# Patient Record
Sex: MALE | Race: WHITE | NOT HISPANIC OR LATINO | Employment: FULL TIME | URBAN - METROPOLITAN AREA
[De-identification: names, ages, dates, MRNs, and addresses within clinical notes are randomized per-mention and may not be internally consistent; named-entity substitution may affect disease eponyms.]

---

## 2017-03-02 ENCOUNTER — OFFICE VISIT (OUTPATIENT)
Dept: FAMILY MEDICINE | Facility: CLINIC | Age: 32
End: 2017-03-02
Payer: COMMERCIAL

## 2017-03-02 VITALS
WEIGHT: 245 LBS | HEART RATE: 88 BPM | TEMPERATURE: 98.2 F | DIASTOLIC BLOOD PRESSURE: 78 MMHG | BODY MASS INDEX: 35.07 KG/M2 | RESPIRATION RATE: 16 BRPM | HEIGHT: 70 IN | SYSTOLIC BLOOD PRESSURE: 128 MMHG

## 2017-03-02 DIAGNOSIS — Z30.09 ENCOUNTER FOR VASECTOMY COUNSELING: Primary | ICD-10-CM

## 2017-03-02 PROCEDURE — 99213 OFFICE O/P EST LOW 20 MIN: CPT | Performed by: FAMILY MEDICINE

## 2017-03-02 RX ORDER — DIAZEPAM 5 MG
TABLET ORAL
Qty: 2 TABLET | Refills: 0 | Status: SHIPPED | OUTPATIENT
Start: 2017-03-02 | End: 2017-04-07

## 2017-03-02 ASSESSMENT — PAIN SCALES - GENERAL: PAINLEVEL: NO PAIN (0)

## 2017-03-02 NOTE — NURSING NOTE
"Chief Complaint   Patient presents with     Consult     Vas       Initial /78 (BP Location: Left arm, Patient Position: Chair, Cuff Size: Adult Large)  Pulse 88  Temp 98.2  F (36.8  C) (Tympanic)  Resp 16  Ht 5' 10.2\" (1.783 m)  Wt 245 lb (111.1 kg)  BMI 34.95 kg/m2 Estimated body mass index is 34.95 kg/(m^2) as calculated from the following:    Height as of this encounter: 5' 10.2\" (1.783 m).    Weight as of this encounter: 245 lb (111.1 kg).  Medication Reconciliation: complete   Geno Mckeon CMA (AAMA)   "

## 2017-03-02 NOTE — MR AVS SNAPSHOT
"              After Visit Summary   3/2/2017    Colton Snider    MRN: [id number]           Patient Information     Date Of Birth          1985        Visit Information        Provider Department      3/2/2017 7:00 AM Jj Puckett MD [facility]        Today's Diagnoses     Encounter for vasectomy counseling    -  1       Follow-ups after your visit        Who to contact     If you have questions or need follow up information about today's clinic visit or your schedule please contact [facility] directly at [phone].  Normal or non-critical lab and imaging results will be communicated to you by CloudFabhart, letter or phone within 4 business days after the clinic has received the results. If you do not hear from us within 7 days, please contact the clinic through [employer] or phone. If you have a critical or abnormal lab result, we will notify you by phone as soon as possible.  Submit refill requests through [employer] or call your pharmacy and they will forward the refill request to us. Please allow 3 business days for your refill to be completed.          Additional Information About Your Visit        MyChart Information     [employer] gives you secure access to your electronic health record. If you see a primary care provider, you can also send messages to your care team and make appointments. If you have questions, please call your primary care clinic.  If you do not have a primary care provider, please call [phone] and they will assist you.        Care EveryWhere ID     This is your Care EveryWhere ID. This could be used by other organizations to access your [facility] medical records  [id number]        Your Vitals Were     Pulse Temperature Respirations Height BMI (Body Mass Index)       88 98.2  F (36.8  C) (Tympanic) 16 5' 10.2\" (1.783 m) 34.95 kg/m2        Blood Pressure from Last 3 Encounters:   03/02/17 128/78   10/21/16 116/76   07/19/16 141/89    Weight from Last " 3 Encounters:   03/02/17 245 lb (111.1 kg)   10/21/16 249 lb (112.9 kg)   03/11/16 239 lb (108.4 kg)              Today, you had the following     No orders found for display         Today's Medication Changes          These changes are accurate as of: 3/2/17  2:12 PM.  If you have any questions, ask your nurse or doctor.               Start taking these medicines.        Dose/Directions    diazepam 5 MG tablet   Commonly known as:  VALIUM   Used for:  Encounter for vasectomy counseling   Started by:  Jj Puckett MD        Take 30 minutes before procedure.  May repeat x1   Quantity:  2 tablet   Refills:  0            Where to get your medicines      Some of these will need a paper prescription and others can be bought over the counter.  Ask your nurse if you have questions.     Bring a paper prescription for each of these medications     diazepam 5 MG tablet                Primary Care Provider Office Phone # Fax #    Jj Puckett [phone]       [address]   [facility] [address]        Thank you!     Thank you for choosing [facility]  for your care. Our goal is always to provide you with excellent care. Hearing back from our patients is one way we can continue to improve our services. Please take a few minutes to complete the written survey that you may receive in the mail after your visit with us. Thank you!             Your Updated Medication List - Protect others around you: Learn how to safely use, store and throw away your medicines at www.disposemymeds.org.          This list is accurate as of: 3/2/17  2:12 PM.  Always use your most recent med list.                   Brand Name Dispense Instructions for use    diazepam 5 MG tablet    VALIUM    2 tablet    Take 30 minutes before procedure.  May repeat x1       triamcinolone 0.1 % ointment    KENALOG    80 g    Apply sparingly to affected area three times daily for 14 days.

## 2017-03-02 NOTE — PROGRESS NOTES
"SUBJECTIVE:  This 31 year old male is in for a vasectomy consultation.  He and his partner have decided they don't want to have any more children. They have decided that he will have the sterilization procedure instead of her.  He understands that this is a permanent procedure.  Reversal could be an option, but has a less than 25% success rate.  Patient was given information to read prior to the consultation.      We talked about the risks and benefits of the vasectomy; risks being that of potential for bleeding, infection, postoperative discomfort immediately which can last for a number of weeks afterwards, also the development of spermatoceles or sperm granulomas, epididymal cysts and the possibility of failure of the procedure producing failed attempt at sterility.     Also mentioned to the patient that there is some literature out there that suggests men who have vasectomies are at increased risk for prostate cancer; however, this literature is inconclusive and controversial.  It is recommended that men who have vasectomies should discuss appropriate prostate cancer screening with their regular provider.     I went through the procedure with the patient, how it is done, a midline incision in the scrotum measuring approximately 1/2 inch in length.  The vas deferens is brought up through this incision, dissected free and cut in two.  Each open end is cauterized and then the proximal or distal end is buried away from the other.  Patient had no questions when it came to the procedure itself.  I did show him pictures and diagrams of the procedure.  I showed him where a potential spermatocele or sperm granuloma can occur.      Again, the patient had no further questions for me.    OBJECTIVE:  /78 (BP Location: Left arm, Patient Position: Chair, Cuff Size: Adult Large)  Pulse 88  Temp 98.2  F (36.8  C) (Tympanic)  Resp 16  Ht 5' 10.2\" (1.783 m)  Wt 245 lb (111.1 kg)  BMI 34.95 kg/m2  On exam, this is a " well-developed, well-nourished white male.    Exam reveals a normal circumcised penis, no lesions.  Testes are descended bilaterally.  Exam was limited to the genitalia.  Both vas deferens were easily identified.  No other abnormalities were noted.      ASSESSMENT:  Undesired fertility in an adult male, requesting vasectomy.    PLAN:  He will schedule a vasectomy at his convenience for either the last appointment of the morning or on a Thursday or Friday afternoon.  He is aware that he will need to take the entire weekend off and have essentially bedrest for two days with ice packs every two hours and ibuprofen for discomfort.  I offered him a prescription for ibuprofen 800 mg to take every eight hours with food after the procedure, but he declined the prescription as he prefers to use the OTC equivalent.  I gave him a prescription for Valium two 5 mg tablets.  He will take 1 tablet upon arrival to clinic and will have the second tablet available to take as needed.  He understands that he will need a  after the procedure due to taking this type of medication.    If he has any further questions, he will contact me prior to the procedure or ask me on the day of the procedure.  He also is aware that he will need to bring a specimen after 12 weeks to make sure that he has cleared the storage vesicles of any residual sperm and to make sure that he is sterile.  He understands that until this is done, he and his partner should continue their regular method of contraception.  I also recommended that he submit a repeat specimen 1 year after the procedure to document continued sterility.      I spent 20 minutes of face to face time with the patient, >50% of which was spent counseling the patient on his future vasectomy.     Jj Puckett MD

## 2017-04-07 ENCOUNTER — OFFICE VISIT (OUTPATIENT)
Dept: FAMILY MEDICINE | Facility: CLINIC | Age: 32
End: 2017-04-07
Payer: COMMERCIAL

## 2017-04-07 VITALS
HEIGHT: 70 IN | TEMPERATURE: 98 F | OXYGEN SATURATION: 99 % | HEART RATE: 74 BPM | DIASTOLIC BLOOD PRESSURE: 74 MMHG | BODY MASS INDEX: 35.07 KG/M2 | RESPIRATION RATE: 18 BRPM | SYSTOLIC BLOOD PRESSURE: 128 MMHG | WEIGHT: 245 LBS

## 2017-04-07 DIAGNOSIS — Z30.2 ENCOUNTER FOR VASECTOMY: Primary | ICD-10-CM

## 2017-04-07 DIAGNOSIS — Z30.2 ENCOUNTER FOR STERILIZATION: ICD-10-CM

## 2017-04-07 PROCEDURE — 55250 REMOVAL OF SPERM DUCT(S): CPT | Performed by: FAMILY MEDICINE

## 2017-04-07 ASSESSMENT — PAIN SCALES - GENERAL: PAINLEVEL: NO PAIN (0)

## 2017-04-07 NOTE — MR AVS SNAPSHOT
"              After Visit Summary   4/7/2017    Colton Snider    MRN: [id number]           Patient Information     Date Of Birth          1985        Visit Information        Provider Department      4/7/2017 2:45 PM Jj Puckett MD; NL PROC ROOM ONE [facility]         Follow-ups after your visit        Who to contact     If you have questions or need follow up information about today's clinic visit or your schedule please contact [facility] directly at [phone].  Normal or non-critical lab and imaging results will be communicated to you by MyChart, letter or phone within 4 business days after the clinic has received the results. If you do not hear from us within 7 days, please contact the clinic through IndyGeekhart or phone. If you have a critical or abnormal lab result, we will notify you by phone as soon as possible.  Submit refill requests through [facility] or call your pharmacy and they will forward the refill request to us. Please allow 3 business days for your refill to be completed.          Additional Information About Your Visit        MyChart Information     [facility] gives you secure access to your electronic health record. If you see a primary care provider, you can also send messages to your care team and make appointments. If you have questions, please call your primary care clinic.  If you do not have a primary care provider, please call [phone] and they will assist you.        Care EveryWhere ID     This is your Care EveryWhere ID. This could be used by other organizations to access your [facility] medical records  [id number]        Your Vitals Were     Pulse Temperature Respirations Height Pulse Oximetry BMI (Body Mass Index)    74 98  F (36.7  C) (Tympanic) 18 5' 10\" (1.778 m) 99% 35.15 kg/m2       Blood Pressure from Last 3 Encounters:   04/07/17 128/74   03/02/17 128/78   10/21/16 116/76    Weight from Last 3 Encounters:   04/07/17 245 " lb (111.1 kg)   03/02/17 245 lb (111.1 kg)   10/21/16 249 lb (112.9 kg)              Today, you had the following     No orders found for display         Today's Medication Changes          These changes are accurate as of: 4/7/17  3:42 PM.  If you have any questions, ask your nurse or doctor.               Stop taking these medicines if you haven't already. Please contact your care team if you have questions.     diazepam 5 MG tablet   Commonly known as:  VALIUM   Stopped by:  Jj Puckett MD           triamcinolone 0.1 % ointment   Commonly known as:  KENALOG   Stopped by:  Jj Puckett MD                    Primary Care Provider Office Phone # Fax #    Jj Puckett [phone]       [address]   [facility] [address]        Thank you!     Thank you for choosing [facility]  for your care. Our goal is always to provide you with excellent care. Hearing back from our patients is one way we can continue to improve our services. Please take a few minutes to complete the written survey that you may receive in the mail after your visit with us. Thank you!             Your Updated Medication List - Protect others around you: Learn how to safely use, store and throw away your medicines at www.disposemymeds.org.      Notice  As of 4/7/2017  3:42 PM    You have not been prescribed any medications.

## 2017-04-07 NOTE — NURSING NOTE
"Chief Complaint   Patient presents with     Vasectomy     consult on 3-2-2017       Initial /74 (BP Location: Left arm, Patient Position: Chair, Cuff Size: Adult Large)  Pulse 74  Temp 98  F (36.7  C) (Tympanic)  Resp 18  Ht 5' 10\" (1.778 m)  Wt 245 lb (111.1 kg)  SpO2 99%  BMI 35.15 kg/m2 Estimated body mass index is 35.15 kg/(m^2) as calculated from the following:    Height as of this encounter: 5' 10\" (1.778 m).    Weight as of this encounter: 245 lb (111.1 kg).  Medication Reconciliation: complete     Pt and wife in room. Went over directions with them, gave them speciman cups, labels.    "

## 2017-04-09 NOTE — PROGRESS NOTES
SUBJECTIVE:  Colton Snider is a 31 year old male who comes in today for vasectomy.  The patient (and his significant other) have previously been in and we discussed the other options for birth control, the risks and benefits of the procedure.  Details of those risks and benefits have been noted on the consent form which has been signed. Prostate cancer data was also reviewed.  Patient took 10 mg of valium prior to procedure.  All questions have been answered.    Procedure:  In the supine position,the patient was sterilely prepped and draped in the usual fashion.  The left vas was first identified and brought up to the midline raphae where a small wheal of Lidocaine with epinephrine was placed in the skin overlying the vas and further anesthesia injected in to the vas sheath.  The no-scalpel vas clamp was then used to grasp the vas and the no-scalpel dissecting instrument was then used to puncture the skin and dissect out the vas free of adventitial tissue.  Skin bleeders were cauterized with thermocautery as necessary.  When a segment of vas was clearly freed up, the vas clamped and ligated with both open open ends cauterized directly inside the lumen.  The distal end was then buried with pursestring suture.  Any bleeders were then cauterized and/or ligated with 4-0 Vicryl.  When the sterile field was completely dry, it was returned to the scrotal sac.  The same procedure was done on the right side. Bacitracin dressing was placed over open wound.      ASSESSMENT:    Male sterilization via vasectomy without complication.    PLAN:   Post vas instruction sheet is reviewed and given along with a specimen container.  Patient instructed to rest over the next couple of days, apply ice and use 600-800mg of ibuprofen tid.  Patient is not considered sterile until the post vas specimen verifies aspermia.  One year followup also recommended.  To call for any problems as outlined and activity as outlined in the sheet  given.    Jj Puckett MD

## 2017-07-14 DIAGNOSIS — Z30.2 ENCOUNTER FOR VASECTOMY: ICD-10-CM

## 2017-07-14 DIAGNOSIS — Z30.2 ENCOUNTER FOR STERILIZATION: ICD-10-CM

## 2017-07-14 LAB — SPERM P VAS SMN QL MICRO: NORMAL

## 2017-07-14 PROCEDURE — 89321 SEMEN ANAL SPERM DETECTION: CPT | Performed by: FAMILY MEDICINE

## 2019-02-21 ENCOUNTER — HOSPITAL ENCOUNTER (OUTPATIENT)
Dept: GENERAL RADIOLOGY | Facility: CLINIC | Age: 34
Discharge: HOME | End: 2019-02-21
Attending: FAMILY MEDICINE | Admitting: FAMILY MEDICINE
Payer: COMMERCIAL

## 2019-02-21 ENCOUNTER — OFFICE VISIT (OUTPATIENT)
Dept: FAMILY MEDICINE | Facility: CLINIC | Age: 34
End: 2019-02-21
Payer: COMMERCIAL

## 2019-02-21 VITALS
WEIGHT: 243 LBS | HEIGHT: 70 IN | BODY MASS INDEX: 34.79 KG/M2 | OXYGEN SATURATION: 99 % | TEMPERATURE: 98.2 F | RESPIRATION RATE: 18 BRPM | HEART RATE: 103 BPM | SYSTOLIC BLOOD PRESSURE: 128 MMHG | DIASTOLIC BLOOD PRESSURE: 74 MMHG

## 2019-02-21 DIAGNOSIS — Z98.52 S/P VASECTOMY: ICD-10-CM

## 2019-02-21 DIAGNOSIS — M25.532 LEFT WRIST PAIN: ICD-10-CM

## 2019-02-21 DIAGNOSIS — Z00.01 ENCOUNTER FOR ROUTINE ADULT HEALTH EXAMINATION WITH ABNORMAL FINDINGS: Primary | ICD-10-CM

## 2019-02-21 DIAGNOSIS — Z23 ENCOUNTER FOR IMMUNIZATION: ICD-10-CM

## 2019-02-21 PROCEDURE — 90471 IMMUNIZATION ADMIN: CPT | Performed by: FAMILY MEDICINE

## 2019-02-21 PROCEDURE — 99395 PREV VISIT EST AGE 18-39: CPT | Performed by: FAMILY MEDICINE

## 2019-02-21 PROCEDURE — 90472 IMMUNIZATION ADMIN EACH ADD: CPT | Performed by: FAMILY MEDICINE

## 2019-02-21 PROCEDURE — 90746 HEPB VACCINE 3 DOSE ADULT IM: CPT | Performed by: FAMILY MEDICINE

## 2019-02-21 PROCEDURE — 99213 OFFICE O/P EST LOW 20 MIN: CPT | Mod: 25 | Performed by: FAMILY MEDICINE

## 2019-02-21 PROCEDURE — 73110 X-RAY EXAM OF WRIST: CPT | Mod: TC

## 2019-02-21 PROCEDURE — 90632 HEPA VACCINE ADULT IM: CPT | Performed by: FAMILY MEDICINE

## 2019-02-21 ASSESSMENT — ENCOUNTER SYMPTOMS
HEARTBURN: 0
NERVOUS/ANXIOUS: 0
SORE THROAT: 0
FREQUENCY: 0
COUGH: 0
PALPITATIONS: 0
CHILLS: 0
ABDOMINAL PAIN: 0
DYSURIA: 0
CONSTIPATION: 0
NAUSEA: 0
DIARRHEA: 0
DIZZINESS: 0
HEMATURIA: 0
HEMATOCHEZIA: 0
ARTHRALGIAS: 0
HEADACHES: 0
SHORTNESS OF BREATH: 0
PARESTHESIAS: 0
FEVER: 0
JOINT SWELLING: 0
ARTHRALGIAS: 1
MYALGIAS: 0
WEAKNESS: 0
EYE PAIN: 0

## 2019-02-21 ASSESSMENT — PAIN SCALES - GENERAL: PAINLEVEL: MODERATE PAIN (4)

## 2019-02-21 ASSESSMENT — MIFFLIN-ST. JEOR: SCORE: 2043.49
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
## 2019-02-21 NOTE — RESULT ENCOUNTER NOTE
Colton,  Your results of your x-ray are normal.  I would recommend that you see occupational therapy for hand therapy for your left wrist tendinitis.  I am sending a copy of this to my specialty  so she can place a referral for you.  They should be contacting you in the next 1-2 days to schedule.  Please let me know if you have any questions.    Sincerely,  Dr. Puckett

## 2019-02-22 ENCOUNTER — TELEPHONE (OUTPATIENT)
Dept: FAMILY MEDICINE | Facility: CLINIC | Age: 34
End: 2019-02-22

## 2019-02-22 DIAGNOSIS — M77.8 TENDINITIS OF LEFT WRIST: Primary | ICD-10-CM

## 2019-02-22 NOTE — TELEPHONE ENCOUNTER
Occupational therapy order placed. They will contact patient with the appointment. Sanjuana Braswell LPN

## 2019-02-22 NOTE — TELEPHONE ENCOUNTER
----- Message from Jj Puckett MD sent at 2/21/2019  1:32 PM CST -----  Colton,  Your results of your x-ray are normal.  I would recommend that you see occupational therapy for hand therapy for your left wrist tendinitis.  I am sending a copy of this to my specialty  so she can place a referral for you.  They should be contacting you in the next 1-2 days to schedule.  Please let me know if you have any questions.    Sincerely,  Dr. Puckett

## 2019-03-06 ENCOUNTER — HOSPITAL ENCOUNTER (OUTPATIENT)
Dept: OCCUPATIONAL THERAPY | Facility: CLINIC | Age: 34
Setting detail: THERAPIES SERIES
End: 2019-03-06
Attending: FAMILY MEDICINE
Payer: COMMERCIAL

## 2019-03-06 DIAGNOSIS — M77.8 TENDINITIS OF LEFT WRIST: ICD-10-CM

## 2019-03-06 DIAGNOSIS — Z98.52 S/P VASECTOMY: ICD-10-CM

## 2019-03-06 LAB — SPERM P VAS SMN QL MICRO: NORMAL

## 2019-03-06 PROCEDURE — 89321 SEMEN ANAL SPERM DETECTION: CPT | Performed by: FAMILY MEDICINE

## 2019-03-06 PROCEDURE — 97165 OT EVAL LOW COMPLEX 30 MIN: CPT | Mod: GO | Performed by: OCCUPATIONAL THERAPIST

## 2019-03-06 PROCEDURE — 97110 THERAPEUTIC EXERCISES: CPT | Mod: GO | Performed by: OCCUPATIONAL THERAPIST
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
## 2019-03-06 NOTE — RESULT ENCOUNTER NOTE
Colton,  Your results show no sperm.  You have now completed your follow-up testing and no further testing is required.  Please let me know if you have any questions.    Sincerely,  Dr. Puckett

## 2019-03-12 ENCOUNTER — HOSPITAL ENCOUNTER (OUTPATIENT)
Dept: OCCUPATIONAL THERAPY | Facility: CLINIC | Age: 34
Setting detail: THERAPIES SERIES
End: 2019-03-12
Attending: FAMILY MEDICINE
Payer: COMMERCIAL

## 2019-03-12 PROCEDURE — 97035 APP MDLTY 1+ULTRASOUND EA 15: CPT | Mod: GO | Performed by: OCCUPATIONAL THERAPIST

## 2019-03-12 PROCEDURE — 97140 MANUAL THERAPY 1/> REGIONS: CPT | Mod: GO | Performed by: OCCUPATIONAL THERAPIST

## 2019-03-12 PROCEDURE — 97110 THERAPEUTIC EXERCISES: CPT | Mod: GO | Performed by: OCCUPATIONAL THERAPIST

## 2019-03-21 ENCOUNTER — HOSPITAL ENCOUNTER (OUTPATIENT)
Dept: OCCUPATIONAL THERAPY | Facility: CLINIC | Age: 34
Setting detail: THERAPIES SERIES
End: 2019-03-21
Attending: FAMILY MEDICINE
Payer: COMMERCIAL

## 2019-03-21 PROCEDURE — 97035 APP MDLTY 1+ULTRASOUND EA 15: CPT | Mod: GO | Performed by: OCCUPATIONAL THERAPIST

## 2019-03-21 PROCEDURE — 97110 THERAPEUTIC EXERCISES: CPT | Mod: GO | Performed by: OCCUPATIONAL THERAPIST

## 2019-03-21 PROCEDURE — 97140 MANUAL THERAPY 1/> REGIONS: CPT | Mod: GO | Performed by: OCCUPATIONAL THERAPIST

## 2019-03-28 ENCOUNTER — HOSPITAL ENCOUNTER (OUTPATIENT)
Dept: OCCUPATIONAL THERAPY | Facility: CLINIC | Age: 34
Setting detail: THERAPIES SERIES
End: 2019-03-28
Attending: FAMILY MEDICINE
Payer: COMMERCIAL

## 2019-03-28 PROCEDURE — 97140 MANUAL THERAPY 1/> REGIONS: CPT | Mod: GO

## 2019-03-28 PROCEDURE — 97110 THERAPEUTIC EXERCISES: CPT | Mod: GO

## 2019-03-28 PROCEDURE — 97035 APP MDLTY 1+ULTRASOUND EA 15: CPT | Mod: GO

## 2019-03-28 NOTE — PROGRESS NOTES
Outpatient Occupational Therapy Discharge Note     Patient: Colton Snider  : 1985    Beginning/End Dates of Reporting Period:  3/6/2019 to 3/28/2019.  Patient was seen for 4 occupational therapy sessions.     Referring Provider: Jj Puckett MD    Therapy Diagnosis: Decreased AROM and pain impacting ease and efficiency with ADL/IADLs.    Client Self Report:  Patient reports not wearing brace at work and has not had pain. Patient agrees to discharge on this date.     Objective Measurements:  Objective Measures   Objective Measures ROM;Strength   ROM   Location (anatomical) left wrist   Location Left   Motion Extension;Flexion;Pronation;Supination   ROM Comments Left forearm supination/pronation:WNL, wrist flexion/extension: WNL, ulnar/radial deviation: WNL   Strength   Location Left and right    with elbow flexed: left: 95#; right: 110#; elbows extended: left: 105#, right: 111#   Reid Pinch left: 23.5#; right: 25.5#   Lateral Pinch left: 21#; right: 22#       Goals:   Hand Goals   Hand Goals Dressing;Work;Eating   Eating   Current Functional Task Holding;Gripping   Previous Performance Level Independent   Current Performance Level Moderate difficulty   Goal Target Task Lift and pour a beverage container  (carrying grocery bag)   Goal Target Performance Level Pain free   Due Date 19   Date Goal Met 19   Dressing   Current Functional Task Buttoning;Snapping   Previous Performance Level Independent   Current Performance Level Moderate difficulty   Goal Target Task Button shirt;Button top botton of shirt;Botton pants   Goal Target Performance Level Pain free   Due Date 19   Date Goal Met 19   Work   Current Functional Task Repetitive tasks;Reaching;Manipulating tools;Lifting;Assembling;Carrying   Previous Performance Level Independent   Current Performance Level Severe difficulty;Moderate difficulty   Goal Target Task Hold and manipulate tools;Hold and assemble parts;Complete  repetitive tasks;Hold and manipulate necessary tools;Assemble necessary parts   Goal Target Performance Level Mild difficulty   Due Date 04/17/19   Date Goal Met 03/28/19  (no difficulty)     Plan:  Discharge from therapy.    Discharge:    Reason for Discharge: Patient has met all goals.    Discharge Plan: Patient to continue home program.    MIKE Dunn, MOTR/L

## 2019-10-04 ENCOUNTER — OFFICE VISIT (OUTPATIENT)
Dept: FAMILY MEDICINE | Facility: CLINIC | Age: 34
End: 2019-10-04
Payer: COMMERCIAL

## 2019-10-04 VITALS
TEMPERATURE: 98 F | RESPIRATION RATE: 16 BRPM | DIASTOLIC BLOOD PRESSURE: 76 MMHG | OXYGEN SATURATION: 98 % | SYSTOLIC BLOOD PRESSURE: 130 MMHG | HEART RATE: 113 BPM | WEIGHT: 229 LBS | HEIGHT: 70 IN | BODY MASS INDEX: 32.78 KG/M2

## 2019-10-04 DIAGNOSIS — L30.1 ECZEMA, DYSHIDROTIC: Primary | ICD-10-CM

## 2019-10-04 PROCEDURE — 99213 OFFICE O/P EST LOW 20 MIN: CPT | Performed by: FAMILY MEDICINE

## 2019-10-04 RX ORDER — BETAMETHASONE DIPROPIONATE 0.5 MG/G
CREAM TOPICAL 2 TIMES DAILY
COMMUNITY
End: 2019-10-04

## 2019-10-04 RX ORDER — BETAMETHASONE DIPROPIONATE 0.5 MG/G
CREAM TOPICAL 2 TIMES DAILY
Qty: 15 G | Refills: 3 | Status: SHIPPED | OUTPATIENT
Start: 2019-10-04 | End: 2020-11-12

## 2019-10-04 ASSESSMENT — MIFFLIN-ST. JEOR: SCORE: 1979.99

## 2019-10-04 NOTE — PROGRESS NOTES
"Subjective     Colton Snider is a 33 year old male who presents to clinic today for the following health issues:    HPI     Patient is in clinic to have his betamethasone refilled. Patient states that he uses ot on his feet. He has dry, scaly skin on the tops of both of his feet. He had the betamethasone last filled in 2016.             Here for issues with eczema on his feet.  Has prescription cream from 3 years ago that has worked well for it.  They are very sweaty.  He states this is typically what makes it worse.    Reviewed and updated as needed this visit by Provider  Tobacco  Allergies  Meds  Problems  Med Hx  Surg Hx  Fam Hx         Review of Systems   ROS COMP: Constitutional, HEENT, cardiovascular, pulmonary, gi and gu systems are negative, except as otherwise noted.      Objective    /76 (BP Location: Right arm, Patient Position: Sitting, Cuff Size: Adult Large)   Pulse 113   Temp 98  F (36.7  C) (Temporal)   Resp 16   Ht 1.778 m (5' 10\")   Wt 103.9 kg (229 lb)   SpO2 98%   BMI 32.86 kg/m    Body mass index is 32.86 kg/m .  Physical Exam  Constitutional:       Appearance: Normal appearance.   Skin:     Comments: Eczematous changes on dorsal aspect of bilateral feet.  No discharge, crusting, or spreading of erythema.     Neurological:      Mental Status: He is alert.                    Assessment & Plan     ASSESSMENT/ORDERS:    ICD-10-CM    1. Eczema, dyshidrotic L30.1 betamethasone dipropionate (DIPROSONE) 0.05 % external cream     aluminum chloride (DRYSOL) 20 % external solution     PLAN:  1.  Refilled topical steroid as noted above.  2.  Discussed foot care and sweat prevention.  Will have him apply prescription antiperspirant every night to his feet to prevent sweating.  He can use this on the palms of his hands as well as he notes that sweaty palms are a problem, too.      BMI:   Estimated body mass index is 32.86 kg/m  as calculated from the following:    Height as of this " "encounter: 1.778 m (5' 10\").    Weight as of this encounter: 103.9 kg (229 lb).               Return for recheck if symptoms worsen or fail to improve.    Jj Puckett MD  [facility]    "

## 2020-03-01 ENCOUNTER — HEALTH MAINTENANCE LETTER (OUTPATIENT)
Age: 35
End: 2020-03-01

## 2020-05-29 ENCOUNTER — HOSPITAL ENCOUNTER (EMERGENCY)
Facility: CLINIC | Age: 35
Discharge: HOME | End: 2020-05-29
Attending: NURSE PRACTITIONER | Admitting: NURSE PRACTITIONER
Payer: COMMERCIAL

## 2020-05-29 VITALS
SYSTOLIC BLOOD PRESSURE: 148 MMHG | OXYGEN SATURATION: 100 % | DIASTOLIC BLOOD PRESSURE: 100 MMHG | BODY MASS INDEX: 33.72 KG/M2 | RESPIRATION RATE: 16 BRPM | WEIGHT: 235 LBS | TEMPERATURE: 98.5 F

## 2020-05-29 DIAGNOSIS — S61.412A LACERATION OF LEFT HAND WITHOUT FOREIGN BODY, INITIAL ENCOUNTER: ICD-10-CM

## 2020-05-29 PROCEDURE — 25000128 H RX IP 250 OP 636: Performed by: NURSE PRACTITIONER

## 2020-05-29 PROCEDURE — 90471 IMMUNIZATION ADMIN: CPT | Performed by: NURSE PRACTITIONER

## 2020-05-29 PROCEDURE — 25000125 ZZHC RX 250: Performed by: NURSE PRACTITIONER

## 2020-05-29 PROCEDURE — 90715 TDAP VACCINE 7 YRS/> IM: CPT | Performed by: NURSE PRACTITIONER

## 2020-05-29 PROCEDURE — 99282 EMERGENCY DEPT VISIT SF MDM: CPT | Mod: 25 | Performed by: NURSE PRACTITIONER

## 2020-05-29 PROCEDURE — 12002 RPR S/N/AX/GEN/TRNK2.6-7.5CM: CPT | Mod: Z6 | Performed by: NURSE PRACTITIONER

## 2020-05-29 PROCEDURE — 12002 RPR S/N/AX/GEN/TRNK2.6-7.5CM: CPT | Performed by: NURSE PRACTITIONER

## 2020-05-29 RX ADMIN — LIDOCAINE HYDROCHLORIDE 5 ML: 10 INJECTION, SOLUTION INFILTRATION; PERINEURAL at 16:30

## 2020-05-29 RX ADMIN — CLOSTRIDIUM TETANI TOXOID ANTIGEN (FORMALDEHYDE INACTIVATED), CORYNEBACTERIUM DIPHTHERIAE TOXOID ANTIGEN (FORMALDEHYDE INACTIVATED), BORDETELLA PERTUSSIS TOXOID ANTIGEN (GLUTARALDEHYDE INACTIVATED), BORDETELLA PERTUSSIS FILAMENTOUS HEMAGGLUTININ ANTIGEN (FORMALDEHYDE INACTIVATED), BORDETELLA PERTUSSIS PERTACTIN ANTIGEN, AND BORDETELLA PERTUSSIS FIMBRIAE 2/3 ANTIGEN 0.5 ML: 5; 2; 2.5; 5; 3; 5 INJECTION, SUSPENSION INTRAMUSCULAR at 16:30

## 2020-05-29 NOTE — ED AVS SNAPSHOT
[facility] Emergency Department  [address] [place] [address]  Phone:  [phone]  Fax:  [phone]                                    Colton Snider   MRN: [id number]    Department:  [facility] Emergency Department   Date of Visit:  5/29/2020           After Visit Summary Signature Page    I have received my discharge instructions, and my questions have been answered. I have discussed any challenges I see with this plan with the nurse or doctor.    ..........................................................................................................................................  Patient/Patient Representative Signature      ..........................................................................................................................................  Patient Representative Print Name and Relationship to Patient    ..................................................               ................................................  Date                                   Time    ..........................................................................................................................................  Reviewed by Signature/Title    ...................................................              ..............................................  Date                                               Time          22EPIC Rev 08/18

## 2020-05-29 NOTE — ED PROVIDER NOTES
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  Patient is right handed.  DT is not up to date. Patient denies any other injuries.     Allergies:  Allergies   Allergen Reactions     Diflucan [Fluconazole] Rash       Problem List:    Patient Active Problem List    Diagnosis Date Noted     CARDIOVASCULAR SCREENING; LDL GOAL LESS THAN 160 10/30/2015     Priority: Medium        Past Medical History:    No past medical history on file.    Past Surgical History:    Past Surgical History:   Procedure Laterality Date     C MUSCLE-SKIN FLAP,LEG  2010    skin flap on right lower leg     VASECTOMY  04/07/2017       Family History:    Family History   Problem Relation Age of Onset     Hypertension Father      Parkinsonism Father      Cerebrovascular Disease Paternal Grandmother      Cerebrovascular Disease Paternal Grandfather      Colon Cancer No family hx of      Prostate Cancer No family hx of      Diabetes No family hx of      Coronary Artery Disease No family hx of        Social History:  Marital Status:   [2]  Social History     Tobacco Use     Smoking status: Never Smoker     Smokeless tobacco: Never Used   Substance Use Topics     Alcohol use: No     Alcohol/week: 0.0 standard drinks     Comment: 2-3 beverages/weekend     Drug use: No        Medications:    aluminum chloride (DRYSOL) 20 % external solution  betamethasone dipropionate (DIPROSONE) 0.05 % external cream          Review of Systems   All other systems reviewed and are negative.      Physical Exam   BP: (!) 148/100  Heart Rate: 101  Temp: 98.5  F (36.9  C)  Resp: 16  Weight: 106.6 kg (235 lb)  SpO2: 100 %      Physical Exam  Constitutional:       Appearance: Normal appearance.   HENT:      Head: Normocephalic.      Nose: Nose normal.       Mouth/Throat:      Mouth: Mucous membranes are moist.   Eyes:      Extraocular Movements: Extraocular movements intact.   Neck:      Musculoskeletal: Normal range of motion.   Cardiovascular:      Rate and Rhythm: Normal rate.   Pulmonary:      Effort: Pulmonary effort is normal.      Breath sounds: Normal breath sounds.   Musculoskeletal: Normal range of motion.   Skin:     General: Skin is warm.      Capillary Refill: Capillary refill takes less than 2 seconds.      Comments: 2.5 inch laceration to iqbal surface of left hand, no evidence of FB, ligamentous, tendon involvement   Neurological:      General: No focal deficit present.      Mental Status: He is alert.   Psychiatric:         Mood and Affect: Mood normal.         ED Course        Procedures    [facility] Procedure Note        Laceration Repair:    Performed by: ANA MARÍA Butts CNP  Authorized by: ANA MARÍA Butts CNP  Consent given by: Patient who states understanding of the procedure being performed after discussing the risks, benefits and alternatives.    Preparation: Patient was prepped and draped in usual sterile fashion.  Irrigation solution: saline    Body area: left hand  Laceration length: 2.5 inches  Contamination: The wound is not contaminated.  Foreign bodies:none  Tendon involvement: none  Anesthesia: Local  Local anesthetic: Lidocaine     1%  Anesthetic total: 12 ml    Debridement: none  Skin closure: Closed with 13 x 5.0 Ethilon  Technique: interrupted  Approximation: close  Approximation difficulty: simple    Patient tolerance: Patient tolerated the procedure well with no immediate complications.  No results found for this or any previous visit (from the past 24 hour(s)).    Medications   Tdap (tetanus-diphtheria-acell pertussis) (ADACEL) injection 0.5 mL (0.5 mLs Intramuscular Given 5/29/20 1630)   lidocaine 1 % 5 mL (5 mLs Intradermal Given by Other 5/29/20 1630)       Assessments & Plan (with Medical Decision  Making)  Left hand laceration.   SR as noted above.  DT updated.  No indication for imaging.  SR in 10 days.   Wound care and reasons to return discussed, patient agreeable and discharged in stable condition.      I have reviewed the nursing notes.    I have reviewed the findings, diagnosis, plan and need for follow up with the patient.    Discharge Medication List as of 5/29/2020  5:30 PM          Final diagnoses:   Laceration of left hand without foreign body, initial encounter       5/29/2020   [facility] EMERGENCY DEPARTMENT     Tania Trejo, ANA MARÍA CNP  05/29/20 1745

## 2020-10-16 ENCOUNTER — OFFICE VISIT (OUTPATIENT)
Dept: FAMILY MEDICINE | Facility: CLINIC | Age: 35
End: 2020-10-16
Payer: COMMERCIAL

## 2020-10-16 VITALS
DIASTOLIC BLOOD PRESSURE: 72 MMHG | HEIGHT: 70 IN | RESPIRATION RATE: 16 BRPM | SYSTOLIC BLOOD PRESSURE: 128 MMHG | WEIGHT: 223 LBS | TEMPERATURE: 98.4 F | HEART RATE: 140 BPM | OXYGEN SATURATION: 99 % | BODY MASS INDEX: 31.92 KG/M2

## 2020-10-16 DIAGNOSIS — F33.0 MAJOR DEPRESSIVE DISORDER, RECURRENT EPISODE, MILD (H): Primary | ICD-10-CM

## 2020-10-16 PROCEDURE — 99213 OFFICE O/P EST LOW 20 MIN: CPT | Performed by: FAMILY MEDICINE

## 2020-10-16 RX ORDER — CITALOPRAM HYDROBROMIDE 20 MG/1
20 TABLET ORAL DAILY
Qty: 30 TABLET | Refills: 1 | Status: SHIPPED | OUTPATIENT
Start: 2020-10-16 | End: 2021-01-04

## 2020-10-16 ASSESSMENT — ANXIETY QUESTIONNAIRES
GAD7 TOTAL SCORE: 15
IF YOU CHECKED OFF ANY PROBLEMS ON THIS QUESTIONNAIRE, HOW DIFFICULT HAVE THESE PROBLEMS MADE IT FOR YOU TO DO YOUR WORK, TAKE CARE OF THINGS AT HOME, OR GET ALONG WITH OTHER PEOPLE: SOMEWHAT DIFFICULT
3. WORRYING TOO MUCH ABOUT DIFFERENT THINGS: NEARLY EVERY DAY
5. BEING SO RESTLESS THAT IT IS HARD TO SIT STILL: MORE THAN HALF THE DAYS
1. FEELING NERVOUS, ANXIOUS, OR ON EDGE: MORE THAN HALF THE DAYS
7. FEELING AFRAID AS IF SOMETHING AWFUL MIGHT HAPPEN: NOT AT ALL
6. BECOMING EASILY ANNOYED OR IRRITABLE: MORE THAN HALF THE DAYS
2. NOT BEING ABLE TO STOP OR CONTROL WORRYING: NEARLY EVERY DAY

## 2020-10-16 ASSESSMENT — PATIENT HEALTH QUESTIONNAIRE - PHQ9
5. POOR APPETITE OR OVEREATING: NEARLY EVERY DAY
SUM OF ALL RESPONSES TO PHQ QUESTIONS 1-9: 15

## 2020-10-16 ASSESSMENT — PAIN SCALES - GENERAL: PAINLEVEL: NO PAIN (0)

## 2020-10-16 ASSESSMENT — MIFFLIN-ST. JEOR: SCORE: 1942.77

## 2020-10-16 NOTE — PROGRESS NOTES
Subjective     Colton Snider is a 35 year old male who presents to clinic today for the following health issues:    HPI         Abnormal Mood Symptoms  Onset/Duration: 5 months  Description:   Depression (if yes, do PHQ-9): YES  Anxiety (if yes, do CAROLYN-7): YES  Accompanying Signs & Symptoms:  Still participating in activities that you used to enjoy: YES  Fatigue: YES  Irritability: YES  Difficulty concentrating: YES  Changes in appetite: YES  Problems with sleep: YES  Heart racing/beating fast: YES  Abnormally elevated, expansive, or irritable mood: YES  Persistently increased activity or energy: no  Thoughts of hurting yourself or others: no  History:  Recent stress or major life event: YES  Prior depression or anxiety: None  Family history of depression or anxiety: no  Alcohol/drug use: no  Difficulty sleeping: YES  Precipitating or alleviating factors: Hang out with friends, hunting  Therapies tried and outcome: none  PHQ 10/16/2020   PHQ-9 Total Score 15   Q9: Thoughts of better off dead/self-harm past 2 weeks Not at all     CAROLYN-7 SCORE 10/16/2020   Total Score 15     SUBJECTIVE:  Colton  is a 35 year old male who presents for: Mood change as noted above.  No previous history of depression.  This is been going on for about 5 months.  Major life event is a divorce.  Other symptoms as noted above.    No past medical history on file.  Past Surgical History:   Procedure Laterality Date     C MUSCLE-SKIN FLAP,LEG  2010    skin flap on right lower leg     VASECTOMY  04/07/2017     Social History     Tobacco Use     Smoking status: Never Smoker     Smokeless tobacco: Never Used   Substance Use Topics     Alcohol use: No     Alcohol/week: 0.0 standard drinks     Comment: 2-3 beverages/weekend     Current Outpatient Medications   Medication Sig Dispense Refill     aluminum chloride (DRYSOL) 20 % external solution Apply topically At Bedtime 35 mL 11     betamethasone dipropionate (DIPROSONE) 0.05 % external cream Apply  "topically 2 times daily Apply to affected area twice daily.  Do not apply to face. 15 g 3     citalopram (CELEXA) 20 MG tablet Take 1 tablet (20 mg) by mouth daily 30 tablet 1       REVIEW OF SYSTEMS:   5 point ROS negative except as noted above in HPI, including Gen., Resp, CV, GI &  system review.     OBJECTIVE:  Vitals: /72   Pulse 140   Temp 98.4  F (36.9  C) (Temporal)   Resp 16   Ht 1.778 m (5' 10\")   Wt 101.2 kg (223 lb)   SpO2 99%   BMI 32.00 kg/m    BMI= Body mass index is 32 kg/m .  He is alert appears in no distress.  Rather flat affect.  PHQ-9 score is 15 CAROLYN-7 score is 15.    ASSESSMENT:  Depression    PLAN:  He is not interested in counseling at this time.  He would like to try a medication to get him through this.  We will start him on Celexa 20 mg a day.  Discussed mechanism of action.  Watch for side effects and notify us if they are unacceptable.  We will see him back within a month to see how he is doing.        George Mendoza MD  [facility]            "

## 2020-10-16 NOTE — NURSING NOTE
Health Maintenance Due   Topic Date Due     PREVENTIVE CARE VISIT  02/21/2020     INFLUENZA VACCINE (1) 09/01/2020     Health Maintenance reviewed at today's visit patient asked to schedule/complete:   Immunizations:  Patient declined     Denis Faustin CMA

## 2020-10-17 ASSESSMENT — ANXIETY QUESTIONNAIRES: GAD7 TOTAL SCORE: 15

## 2020-11-11 DIAGNOSIS — L30.1 ECZEMA, DYSHIDROTIC: ICD-10-CM

## 2020-11-12 RX ORDER — BETAMETHASONE DIPROPIONATE 0.5 MG/G
CREAM TOPICAL
Qty: 15 G | Refills: 0 | Status: SHIPPED | OUTPATIENT
Start: 2020-11-12 | End: 2023-01-10

## 2020-11-12 NOTE — TELEPHONE ENCOUNTER
Betamethasone Cream      Last Written Prescription Date:  10/4/19  Last Fill Quantity: 15 g,   # refills: 3  Last Office Visit: 10/16/2020  Future Office visit:       Routing refill request to provider for review/approval because:  Drug not on the [facility], [facility] or [facility] refill protocol or controlled substance

## 2020-12-14 ENCOUNTER — HEALTH MAINTENANCE LETTER (OUTPATIENT)
Age: 35
End: 2020-12-14

## 2020-12-29 DIAGNOSIS — F33.0 MAJOR DEPRESSIVE DISORDER, RECURRENT EPISODE, MILD (H): ICD-10-CM

## 2021-01-04 RX ORDER — CITALOPRAM HYDROBROMIDE 20 MG/1
20 TABLET ORAL DAILY
Qty: 30 TABLET | Refills: 1 | Status: SHIPPED | OUTPATIENT
Start: 2021-01-04 | End: 2022-02-08

## 2021-01-04 NOTE — TELEPHONE ENCOUNTER
Routing refill request to provider for review/approval because:  PHQ-9 >4 (score of 15)    KAT RuddN, RN  [facility]

## 2021-10-02 ENCOUNTER — HEALTH MAINTENANCE LETTER (OUTPATIENT)
Age: 36
End: 2021-10-02

## 2022-01-22 ENCOUNTER — HEALTH MAINTENANCE LETTER (OUTPATIENT)
Age: 37
End: 2022-01-22

## 2022-02-08 ENCOUNTER — OFFICE VISIT (OUTPATIENT)
Dept: FAMILY MEDICINE | Facility: CLINIC | Age: 37
End: 2022-02-08
Payer: COMMERCIAL

## 2022-02-08 VITALS
OXYGEN SATURATION: 98 % | WEIGHT: 240.2 LBS | SYSTOLIC BLOOD PRESSURE: 138 MMHG | DIASTOLIC BLOOD PRESSURE: 88 MMHG | RESPIRATION RATE: 14 BRPM | TEMPERATURE: 98.6 F | HEART RATE: 111 BPM | BODY MASS INDEX: 34.47 KG/M2

## 2022-02-08 DIAGNOSIS — E66.09 CLASS 1 OBESITY DUE TO EXCESS CALORIES WITHOUT SERIOUS COMORBIDITY IN ADULT, UNSPECIFIED BMI: ICD-10-CM

## 2022-02-08 DIAGNOSIS — Z11.59 NEED FOR HEPATITIS C SCREENING TEST: ICD-10-CM

## 2022-02-08 DIAGNOSIS — E66.811 CLASS 1 OBESITY DUE TO EXCESS CALORIES WITHOUT SERIOUS COMORBIDITY IN ADULT, UNSPECIFIED BMI: ICD-10-CM

## 2022-02-08 DIAGNOSIS — J34.9 NASAL DISORDER: Primary | ICD-10-CM

## 2022-02-08 PROCEDURE — 99213 OFFICE O/P EST LOW 20 MIN: CPT | Performed by: FAMILY MEDICINE

## 2022-02-08 ASSESSMENT — ANXIETY QUESTIONNAIRES
GAD7 TOTAL SCORE: 0
7. FEELING AFRAID AS IF SOMETHING AWFUL MIGHT HAPPEN: NOT AT ALL
2. NOT BEING ABLE TO STOP OR CONTROL WORRYING: NOT AT ALL
1. FEELING NERVOUS, ANXIOUS, OR ON EDGE: NOT AT ALL
6. BECOMING EASILY ANNOYED OR IRRITABLE: NOT AT ALL
5. BEING SO RESTLESS THAT IT IS HARD TO SIT STILL: NOT AT ALL
3. WORRYING TOO MUCH ABOUT DIFFERENT THINGS: NOT AT ALL

## 2022-02-08 ASSESSMENT — PATIENT HEALTH QUESTIONNAIRE - PHQ9
SUM OF ALL RESPONSES TO PHQ QUESTIONS 1-9: 0
5. POOR APPETITE OR OVEREATING: NOT AT ALL

## 2022-02-09 ASSESSMENT — ANXIETY QUESTIONNAIRES: GAD7 TOTAL SCORE: 0

## 2022-02-15 ENCOUNTER — LAB (OUTPATIENT)
Dept: LAB | Facility: CLINIC | Age: 37
End: 2022-02-15
Payer: COMMERCIAL

## 2022-02-15 DIAGNOSIS — E66.811 CLASS 1 OBESITY DUE TO EXCESS CALORIES WITHOUT SERIOUS COMORBIDITY IN ADULT, UNSPECIFIED BMI: ICD-10-CM

## 2022-02-15 DIAGNOSIS — Z11.59 NEED FOR HEPATITIS C SCREENING TEST: ICD-10-CM

## 2022-02-15 DIAGNOSIS — E66.09 CLASS 1 OBESITY DUE TO EXCESS CALORIES WITHOUT SERIOUS COMORBIDITY IN ADULT, UNSPECIFIED BMI: ICD-10-CM

## 2022-02-15 LAB
CHOLEST SERPL-MCNC: 174 MG/DL
FASTING STATUS PATIENT QL REPORTED: YES
FASTING STATUS PATIENT QL REPORTED: YES
GLUCOSE BLD-MCNC: 113 MG/DL (ref 70–99)
HCV AB SERPL QL IA: NONREACTIVE
HDLC SERPL-MCNC: 26 MG/DL
LDLC SERPL CALC-MCNC: 80 MG/DL
NONHDLC SERPL-MCNC: 148 MG/DL
TRIGL SERPL-MCNC: 340 MG/DL

## 2022-02-15 PROCEDURE — 86803 HEPATITIS C AB TEST: CPT

## 2022-02-15 PROCEDURE — 80061 LIPID PANEL: CPT

## 2022-02-15 PROCEDURE — 82947 ASSAY GLUCOSE BLOOD QUANT: CPT

## 2022-02-15 PROCEDURE — 36415 COLL VENOUS BLD VENIPUNCTURE: CPT

## 2022-02-16 NOTE — RESULT ENCOUNTER NOTE
Colton,  Your results cholesterol ratio of good and bad cholesterol, triglycerides level and elevated glucose are all signs that you need to increase your daily exercise, focus on less sugar/processed food intake.  Let me know if you want to focus more on how you can improve this.  The good new is you do not need medication at this time for management of this.  Please let me know if you have other questions.    Sincerely,  Dr. Puckett

## 2022-03-07 NOTE — PROGRESS NOTES
ENT Consultation    Colton Snider who is a 36 year old male seen in consultation at the request of Jj Puckett.      History of Present Illness - Colton Snider is a 36 year old male presents with chief complaint of nasal congestion and sneezing.  Pain for last 5 years he has had increasing amount of sneezing sometimes paroxysms of sneezing hurts his back.  He does have back issues.  Denies any known history of seasonal or perennial allergies.  However was never tested.  He says smells fine.  He denies nasal trauma.  He can breathe better on the left than the right.      Body mass index is 34.76 kg/m .    Weight management plan: Patient was referred to their PCP to discuss a diet and exercise plan.    BP Readings from Last 1 Encounters:   03/14/22 136/76       BP noted to be well controlled today in office.     Colton IS NOT a smoker/uses chewing tobacco.        Past Medical History - History reviewed. No pertinent past medical history.    Current Medications -   Current Outpatient Medications:      aluminum chloride (DRYSOL) 20 % external solution, Apply topically At Bedtime, Disp: 35 mL, Rfl: 11     betamethasone dipropionate (DIPROSONE) 0.05 % external cream, APPLY TOPICALLY TO AFFECTED AREA TWO TIMES A DAY DO NOT APPLY TO FACE, Disp: 15 g, Rfl: 0    Allergies -   Allergies   Allergen Reactions     Diflucan [Fluconazole] Rash       Social History -   Social History     Socioeconomic History     Marital status:      Spouse name: Not on file     Number of children: Not on file     Years of education: Not on file     Highest education level: Not on file   Occupational History     Not on file   Tobacco Use     Smoking status: Never Smoker     Smokeless tobacco: Never Used   Substance and Sexual Activity     Alcohol use: No     Alcohol/week: 0.0 standard drinks     Comment: 2-3 beverages/weekend     Drug use: No     Sexual activity: Yes     Partners: Male     Birth control/protection: Male Surgical      "Comment: vasectomy   Other Topics Concern     Parent/sibling w/ CABG, MI or angioplasty before 65F 55M? Not Asked   Social History Narrative     Not on file     Social Determinants of Health     Financial Resource Strain: Not on file   Food Insecurity: Not on file   Transportation Needs: Not on file   Physical Activity: Not on file   Stress: Not on file   Social Connections: Not on file   Intimate Partner Violence: Not on file   Housing Stability: Not on file       Family History -   Family History   Problem Relation Age of Onset     Hypertension Father      Parkinsonism Father      Cerebrovascular Disease Paternal Grandmother      Cerebrovascular Disease Paternal Grandfather      Colon Cancer No family hx of      Prostate Cancer No family hx of      Diabetes No family hx of      Coronary Artery Disease No family hx of        Review of Systems - As per HPI and PMHx, otherwise review of system review of the head and neck negative. Otherwise 10+ review of system is negative    Physical Exam  /76 (BP Location: Right arm, Patient Position: Sitting, Cuff Size: Adult Regular)   Temp 98.1  F (36.7  C) (Temporal)   Ht 1.778 m (5' 10\")   Wt 109.9 kg (242 lb 4 oz)   BMI 34.76 kg/m    BMI: Body mass index is 34.76 kg/m .    General - The patient is well nourished and well developed, and appears to have good nutritional status.  Alert and oriented to person and place, answers questions and cooperates with examination appropriately.    SKIN - No suspicious lesions or rashes.  Respiration - No respiratory distress.  Head and Face - Normocephalic and atraumatic, with no gross asymmetry noted of the contour of the facial features.  The facial nerve is intact, with strong symmetric movements.    Voice and Breathing - The patient was breathing comfortably without the use of accessory muscles. The patients voice was clear and strong, and had appropriate pitch and quality.    Ears - Bilateral pinna and EACs with normal " appearing overlying skin. Tympanic membrane intact with good mobility on pneumatic otoscopy bilaterally. Bony landmarks of the ossicular chain are normal. The tympanic membranes are normal in appearance. No retraction, perforation, or masses.  No fluid or purulence was seen in the external canal or the middle ear.     Eyes - Extraocular movements intact.  Sclera were not icteric or injected, conjunctiva were pink and moist.    Mouth - Examination of the oral cavity showed pink, healthy oral mucosa. No lesions or ulcerations noted.  The tongue was mobile and midline, and the dentition were in good condition.      Throat - The walls of the oropharynx were smooth, pink, moist, symmetric, and had no lesions or ulcerations.  The tonsillar pillars and soft palate were symmetric.  The uvula was midline on elevation.    Neck - Normal midline excursion of the laryngotracheal complex during swallowing.  Full range of motion on passive movement.  Palpation of the occipital, submental, submandibular, internal jugular chain, and supraclavicular nodes did not demonstrate any abnormal lymph nodes or masses.  The carotid pulse was palpable bilaterally.  Palpation of the thyroid was soft and smooth, with no nodules or goiter appreciated.  The trachea was mobile and midline.    Nose - External contour is symmetric, no gross deflection or scars.  Nasal mucosa is somewhat erythematous and and dry with some scabbing especially on the right side.  He does get nosebleeds off and on the right side but sometimes on the left since he was very young..  The septum was deviated to the left and somewhat obstructive, turbinates of slightly enlarged size and position.  No polyps, masses, or purulence noted on examination.    Neuro - Nonfocal neuro exam is normal, CN 2 through 12 intact, normal gait and muscle tone.      Performed in clinic today:  To further evaluate the nasal cavity, I performed rigid nasal endoscopy.  I first sprayed the nasal  cavity bilaterally with a mix of lidocaine and neosynephrine.  I then began on the left side using a 2.7mm, 30 degree rigid nasal endoscope.  The septum was deviated and the nasal airway was obstructed.  No abnormal secretions, purulence, or polyps were noted. The left middle turbinate and middle meatus were clearly visualized and normal in appearance.  Looking up, the olfactory cleft was unobstructed.  Going further back, the sphenoethmoid recess was normal in appearance, with healthy appearing mucosa on the face of the sphenoid.  The nasopharynx was unremarkable, and the eustachian tube opening on this side was unobstructed.    I then turned my attention to the right side.  Once again, the septum was deviated, and the airway was open.  A lot of dry scabs especially on the septum noted and a little bit on the inferior turbinates, but no purulence, or polyps were noted.  The right middle turbinate and middle meatus were clearly visualized and normal in appearance.  Looking up, the olfactory cleft was unobstructed.  Going further back the right sphenoethmoid recess was normal in appearance, and eustachian tube opening was unobstructed.   Black - [id number] [facility]      A/P - Colton Snider is a 36 year old male patient with a nonspecific sneezing episodes paroxysms also nasal obstruction congestion.  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  This is been ongoing for number of years.  We also discussed his nosebleeds as a result of the dryness and dry scabs.  At this point he wants to try conservative treatment which we will using saline in the morning followed by azelastine spray.  I do not want to prescribe nasal steroid due to potentially furthering his nosebleeds.  We will recheck in 2 months.      Miguel Farley MD

## 2022-03-14 ENCOUNTER — OFFICE VISIT (OUTPATIENT)
Dept: OTOLARYNGOLOGY | Facility: CLINIC | Age: 37
End: 2022-03-14
Payer: COMMERCIAL

## 2022-03-14 VITALS
HEIGHT: 70 IN | TEMPERATURE: 98.1 F | DIASTOLIC BLOOD PRESSURE: 76 MMHG | SYSTOLIC BLOOD PRESSURE: 136 MMHG | WEIGHT: 242.25 LBS | BODY MASS INDEX: 34.68 KG/M2

## 2022-03-14 DIAGNOSIS — J34.89 NASAL OBSTRUCTION: ICD-10-CM

## 2022-03-14 DIAGNOSIS — J34.9 NASAL DISORDER: ICD-10-CM

## 2022-03-14 DIAGNOSIS — R06.7 SNEEZING: Primary | ICD-10-CM

## 2022-03-14 DIAGNOSIS — J30.9 ALLERGIC RHINITIS, UNSPECIFIED SEASONALITY, UNSPECIFIED TRIGGER: ICD-10-CM

## 2022-03-14 PROCEDURE — 99203 OFFICE O/P NEW LOW 30 MIN: CPT | Mod: 25 | Performed by: OTOLARYNGOLOGY

## 2022-03-14 PROCEDURE — 31231 NASAL ENDOSCOPY DX: CPT | Performed by: OTOLARYNGOLOGY

## 2022-03-14 RX ORDER — AZELASTINE 1 MG/ML
2 SPRAY, METERED NASAL EVERY MORNING
Qty: 30 ML | Refills: 1 | Status: SHIPPED | OUTPATIENT
Start: 2022-03-14 | End: 2022-04-13

## 2022-03-14 ASSESSMENT — PAIN SCALES - GENERAL: PAINLEVEL: NO PAIN (0)

## 2022-03-14 NOTE — LETTER
3/14/2022         RE: Colton Snider  [address] [address] [place] [id number]        Dear Colleague,    Thank you for referring your patient, Colton Snider, to the [facility]. Please see a copy of my visit note below.    ENT Consultation    Colton Snider who is a 36 year old male seen in consultation at the request of Jj Puckett.      History of Present Illness - Colton Snider is a 36 year old male presents with chief complaint of nasal congestion and sneezing.  Pain for last 5 years he has had increasing amount of sneezing sometimes paroxysms of sneezing hurts his back.  He does have back issues.  Denies any known history of seasonal or perennial allergies.  However was never tested.  He says smells fine.  He denies nasal trauma.  He can breathe better on the left than the right.      Body mass index is 34.76 kg/m .    Weight management plan: Patient was referred to their PCP to discuss a diet and exercise plan.    BP Readings from Last 1 Encounters:   03/14/22 136/76       BP noted to be well controlled today in office.     Colton IS NOT a smoker/uses chewing tobacco.        Past Medical History - History reviewed. No pertinent past medical history.    Current Medications -   Current Outpatient Medications:      aluminum chloride (DRYSOL) 20 % external solution, Apply topically At Bedtime, Disp: 35 mL, Rfl: 11     betamethasone dipropionate (DIPROSONE) 0.05 % external cream, APPLY TOPICALLY TO AFFECTED AREA TWO TIMES A DAY DO NOT APPLY TO FACE, Disp: 15 g, Rfl: 0    Allergies -   Allergies   Allergen Reactions     Diflucan [Fluconazole] Rash       Social History -   Social History     Socioeconomic History     Marital status:      Spouse name: Not on file     Number of children: Not on file     Years of education: Not on file     Highest education level: Not on file   Occupational History     Not on file   Tobacco Use     Smoking status: Never Smoker     Smokeless  "tobacco: Never Used   Substance and Sexual Activity     Alcohol use: No     Alcohol/week: 0.0 standard drinks     Comment: 2-3 beverages/weekend     Drug use: No     Sexual activity: Yes     Partners: Male     Birth control/protection: Male Surgical     Comment: vasectomy   Other Topics Concern     Parent/sibling w/ CABG, MI or angioplasty before 65F 55M? Not Asked   Social History Narrative     Not on file     Social Determinants of Health     Financial Resource Strain: Not on file   Food Insecurity: Not on file   Transportation Needs: Not on file   Physical Activity: Not on file   Stress: Not on file   Social Connections: Not on file   Intimate Partner Violence: Not on file   Housing Stability: Not on file       Family History -   Family History   Problem Relation Age of Onset     Hypertension Father      Parkinsonism Father      Cerebrovascular Disease Paternal Grandmother      Cerebrovascular Disease Paternal Grandfather      Colon Cancer No family hx of      Prostate Cancer No family hx of      Diabetes No family hx of      Coronary Artery Disease No family hx of        Review of Systems - As per HPI and PMHx, otherwise review of system review of the head and neck negative. Otherwise 10+ review of system is negative    Physical Exam  /76 (BP Location: Right arm, Patient Position: Sitting, Cuff Size: Adult Regular)   Temp 98.1  F (36.7  C) (Temporal)   Ht 1.778 m (5' 10\")   Wt 109.9 kg (242 lb 4 oz)   BMI 34.76 kg/m    BMI: Body mass index is 34.76 kg/m .    General - The patient is well nourished and well developed, and appears to have good nutritional status.  Alert and oriented to person and place, answers questions and cooperates with examination appropriately.    SKIN - No suspicious lesions or rashes.  Respiration - No respiratory distress.  Head and Face - Normocephalic and atraumatic, with no gross asymmetry noted of the contour of the facial features.  The facial nerve is intact, with strong " symmetric movements.    Voice and Breathing - The patient was breathing comfortably without the use of accessory muscles. The patients voice was clear and strong, and had appropriate pitch and quality.    Ears - Bilateral pinna and EACs with normal appearing overlying skin. Tympanic membrane intact with good mobility on pneumatic otoscopy bilaterally. Bony landmarks of the ossicular chain are normal. The tympanic membranes are normal in appearance. No retraction, perforation, or masses.  No fluid or purulence was seen in the external canal or the middle ear.     Eyes - Extraocular movements intact.  Sclera were not icteric or injected, conjunctiva were pink and moist.    Mouth - Examination of the oral cavity showed pink, healthy oral mucosa. No lesions or ulcerations noted.  The tongue was mobile and midline, and the dentition were in good condition.      Throat - The walls of the oropharynx were smooth, pink, moist, symmetric, and had no lesions or ulcerations.  The tonsillar pillars and soft palate were symmetric.  The uvula was midline on elevation.    Neck - Normal midline excursion of the laryngotracheal complex during swallowing.  Full range of motion on passive movement.  Palpation of the occipital, submental, submandibular, internal jugular chain, and supraclavicular nodes did not demonstrate any abnormal lymph nodes or masses.  The carotid pulse was palpable bilaterally.  Palpation of the thyroid was soft and smooth, with no nodules or goiter appreciated.  The trachea was mobile and midline.    Nose - External contour is symmetric, no gross deflection or scars.  Nasal mucosa is somewhat erythematous and and dry with some scabbing especially on the right side.  He does get nosebleeds off and on the right side but sometimes on the left since he was very young..  The septum was deviated to the left and somewhat obstructive, turbinates of slightly enlarged size and position.  No polyps, masses, or purulence  noted on examination.    Neuro - Nonfocal neuro exam is normal, CN 2 through 12 intact, normal gait and muscle tone.      Performed in clinic today:  To further evaluate the nasal cavity, I performed rigid nasal endoscopy.  I first sprayed the nasal cavity bilaterally with a mix of lidocaine and neosynephrine.  I then began on the left side using a 2.7mm, 30 degree rigid nasal endoscope.  The septum was deviated and the nasal airway was obstructed.  No abnormal secretions, purulence, or polyps were noted. The left middle turbinate and middle meatus were clearly visualized and normal in appearance.  Looking up, the olfactory cleft was unobstructed.  Going further back, the sphenoethmoid recess was normal in appearance, with healthy appearing mucosa on the face of the sphenoid.  The nasopharynx was unremarkable, and the eustachian tube opening on this side was unobstructed.    I then turned my attention to the right side.  Once again, the septum was deviated, and the airway was open.  A lot of dry scabs especially on the septum noted and a little bit on the inferior turbinates, but no purulence, or polyps were noted.  The right middle turbinate and middle meatus were clearly visualized and normal in appearance.  Looking up, the olfactory cleft was unobstructed.  Going further back the right sphenoethmoid recess was normal in appearance, and eustachian tube opening was unobstructed.   Black - [id number] [facility]      A/P - Colton REJI Snider is a 36 year old male patient with a nonspecific sneezing episodes paroxysms also nasal obstruction congestion.  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  This is been ongoing for number of years.  We also discussed his nosebleeds as a result of the dryness and dry scabs.  At this point he wants to try conservative treatment which we will using saline in  the morning followed by azelastine spray.  I do not want to prescribe nasal steroid due to potentially furthering his nosebleeds.  We will recheck in 2 months.      Miguel Farley MD        Again, thank you for allowing me to participate in the care of your patient.        Sincerely,        Miguel Farley MD, MD

## 2022-06-13 NOTE — PROGRESS NOTES
Colton to follow up with Primary Care provider regarding elevated blood pressure.        History of Present Illness - Colton Snider is a 36 year old male presenting in clinic today for a recheck on Patient presents with:  RECHECK    Patient initially presented with nasal congestion obstruction sneezing without any known history of allergies.  He has been trying nasal steroid sprays that taking nosebleeds then azelastine and not much relief.  He still sneezes but the main problem is difficulty of actually using his nose with nasal congestion is most bothersome.      BP Readings from Last 1 Encounters:   06/20/22 (!) 136/90       BP noted to be elevated today in office.  Patient to follow up with Primary Care provider regarding elevated blood pressure.    Colton IS NOT a smoker/uses chewing tobacco.        Past Medical History - History reviewed. No pertinent past medical history.    Current Medications -   Current Outpatient Medications:      aluminum chloride (DRYSOL) 20 % external solution, Apply topically At Bedtime, Disp: 35 mL, Rfl: 11     betamethasone dipropionate (DIPROSONE) 0.05 % external cream, APPLY TOPICALLY TO AFFECTED AREA TWO TIMES A DAY DO NOT APPLY TO FACE, Disp: 15 g, Rfl: 0    Allergies -   Allergies   Allergen Reactions     Diflucan [Fluconazole] Rash       Social History -   Social History     Socioeconomic History     Marital status:    Tobacco Use     Smoking status: Never Smoker     Smokeless tobacco: Never Used   Substance and Sexual Activity     Alcohol use: No     Alcohol/week: 0.0 standard drinks     Comment: 2-3 beverages/weekend     Drug use: No     Sexual activity: Yes     Partners: Male     Birth control/protection: Male Surgical     Comment: vasectomy       Family History -   Family History   Problem Relation Age of Onset     Hypertension Father      Parkinsonism Father      Cerebrovascular Disease Paternal Grandmother      Cerebrovascular Disease Paternal Grandfather      Colon  "Cancer No family hx of      Prostate Cancer No family hx of      Diabetes No family hx of      Coronary Artery Disease No family hx of        Review of Systems - As per HPI and PMHx, otherwise review of system review of the head and neck negative. Otherwise 10+ review of system is negative    Physical Exam  BP (!) 136/90 (BP Location: Right arm, Patient Position: Sitting, Cuff Size: Adult Regular)   Temp 97.3  F (36.3  C) (Temporal)   Ht 1.778 m (5' 10\")   Wt 107.5 kg (237 lb)   BMI 34.01 kg/m    BMI: Body mass index is 34.01 kg/m .    General - The patient is well nourished and well developed, and appears to have good nutritional status.  Alert and oriented to person and place, answers questions and cooperates with examination appropriately.    SKIN - No suspicious lesions or rashes.  Respiration - No respiratory distress.  Head and Face - Normocephalic and atraumatic, with no gross asymmetry noted of the contour of the facial features.  The facial nerve is intact, with strong symmetric movements.    Voice and Breathing - The patient was breathing comfortably without the use of accessory muscles. The patients voice was clear and strong, and had appropriate pitch and quality.    Ears - Bilateral pinna and EACs with normal appearing overlying skin. Tympanic membrane intact with good mobility on pneumatic otoscopy bilaterally. Bony landmarks of the ossicular chain are normal. The tympanic membranes are normal in appearance. No retraction, perforation, or masses.  No fluid or purulence was seen in the external canal or the middle ear.     Eyes - Extraocular movements intact.  Sclera were not icteric or injected, conjunctiva were pink and moist.    Mouth - Examination of the oral cavity showed pink, healthy oral mucosa. No lesions or ulcerations noted.  The tongue was mobile and midline, and the dentition were in good condition.      Throat - The walls of the oropharynx were smooth, pink, moist, symmetric, and had " no lesions or ulcerations.  The tonsillar pillars and soft palate were symmetric. Tonsils are symmetric. The uvula was midline on elevation.    Neck - Normal midline excursion of the laryngotracheal complex during swallowing.  Full range of motion on passive movement.  Palpation of the occipital, submental, submandibular, internal jugular chain, and supraclavicular nodes did not demonstrate any abnormal lymph nodes or masses.  The carotid pulse was palpable bilaterally.  Palpation of the thyroid was soft and smooth, with no nodules or goiter appreciated.  The trachea was mobile and midline.    Nose - External contour is symmetric, no gross deflection or scars.  Nasal mucosa is pink and moist with no abnormal mucus.  The septum was deviated to the left and obstructive, turbinates of large size and position.  No polyps, masses, or purulence noted on examination.    Neuro - Nonfocal neuro exam is normal, CN 2 through 12 intact, normal gait and muscle tone.      Performed in clinic today:  No procedures preformed in clinic today      A/P - Colton Snider is a 36 year old male Patient presents with:  RECHECK    At this point patient does have structural issues noted deviated septum large inferior turbinates not responsive to medical conservative therapy with the nasal topical sprays.  We discussed different treatment options and he is interested in septoplasty submucosal resection of inferior turbinates.We discuss risks and possible complication of septoplasty including septal perforation, bleeding(that may require packing), infection, loss of smell, stenosis, CSF rhinorrhea. With this knowledge the patient wishes to proceed with the surgery.      Miguel Farley MD

## 2022-06-20 ENCOUNTER — OFFICE VISIT (OUTPATIENT)
Dept: OTOLARYNGOLOGY | Facility: CLINIC | Age: 37
End: 2022-06-20
Payer: COMMERCIAL

## 2022-06-20 ENCOUNTER — PREP FOR PROCEDURE (OUTPATIENT)
Dept: SLEEP MEDICINE | Facility: CLINIC | Age: 37
End: 2022-06-20

## 2022-06-20 VITALS
BODY MASS INDEX: 33.93 KG/M2 | WEIGHT: 237 LBS | TEMPERATURE: 97.3 F | HEIGHT: 70 IN | SYSTOLIC BLOOD PRESSURE: 136 MMHG | DIASTOLIC BLOOD PRESSURE: 90 MMHG

## 2022-06-20 DIAGNOSIS — J34.3 HYPERTROPHY OF BOTH INFERIOR NASAL TURBINATES: ICD-10-CM

## 2022-06-20 DIAGNOSIS — J34.2 DEVIATED NASAL SEPTUM: Primary | ICD-10-CM

## 2022-06-20 DIAGNOSIS — J34.89 NASAL OBSTRUCTION: Primary | ICD-10-CM

## 2022-06-20 DIAGNOSIS — J34.2 DEVIATED NASAL SEPTUM: ICD-10-CM

## 2022-06-20 PROCEDURE — 99214 OFFICE O/P EST MOD 30 MIN: CPT | Performed by: OTOLARYNGOLOGY

## 2022-06-20 ASSESSMENT — PAIN SCALES - GENERAL: PAINLEVEL: NO PAIN (0)

## 2022-06-20 NOTE — LETTER
6/20/2022         RE: Colton Snider  [address] [address] [place] [id number]        Dear Colleague,    Thank you for referring your patient, Colton Snider, to the [facility]. Please see a copy of my visit note below.    Colton to follow up with Primary Care provider regarding elevated blood pressure.        History of Present Illness - Colton Snider is a 36 year old male presenting in clinic today for a recheck on Patient presents with:  RECHECK    Patient initially presented with nasal congestion obstruction sneezing without any known history of allergies.  He has been trying nasal steroid sprays that taking nosebleeds then azelastine and not much relief.  He still sneezes but the main problem is difficulty of actually using his nose with nasal congestion is most bothersome.      BP Readings from Last 1 Encounters:   06/20/22 (!) 136/90       BP noted to be elevated today in office.  Patient to follow up with Primary Care provider regarding elevated blood pressure.    Colton IS NOT a smoker/uses chewing tobacco.        Past Medical History - History reviewed. No pertinent past medical history.    Current Medications -   Current Outpatient Medications:      aluminum chloride (DRYSOL) 20 % external solution, Apply topically At Bedtime, Disp: 35 mL, Rfl: 11     betamethasone dipropionate (DIPROSONE) 0.05 % external cream, APPLY TOPICALLY TO AFFECTED AREA TWO TIMES A DAY DO NOT APPLY TO FACE, Disp: 15 g, Rfl: 0    Allergies -   Allergies   Allergen Reactions     Diflucan [Fluconazole] Rash       Social History -   Social History     Socioeconomic History     Marital status:    Tobacco Use     Smoking status: Never Smoker     Smokeless tobacco: Never Used   Substance and Sexual Activity     Alcohol use: No     Alcohol/week: 0.0 standard drinks     Comment: 2-3 beverages/weekend     Drug use: No     Sexual activity: Yes     Partners: Male     Birth control/protection: Male Surgical     " Comment: vasectomy       Family History -   Family History   Problem Relation Age of Onset     Hypertension Father      Parkinsonism Father      Cerebrovascular Disease Paternal Grandmother      Cerebrovascular Disease Paternal Grandfather      Colon Cancer No family hx of      Prostate Cancer No family hx of      Diabetes No family hx of      Coronary Artery Disease No family hx of        Review of Systems - As per HPI and PMHx, otherwise review of system review of the head and neck negative. Otherwise 10+ review of system is negative    Physical Exam  BP (!) 136/90 (BP Location: Right arm, Patient Position: Sitting, Cuff Size: Adult Regular)   Temp 97.3  F (36.3  C) (Temporal)   Ht 1.778 m (5' 10\")   Wt 107.5 kg (237 lb)   BMI 34.01 kg/m    BMI: Body mass index is 34.01 kg/m .    General - The patient is well nourished and well developed, and appears to have good nutritional status.  Alert and oriented to person and place, answers questions and cooperates with examination appropriately.    SKIN - No suspicious lesions or rashes.  Respiration - No respiratory distress.  Head and Face - Normocephalic and atraumatic, with no gross asymmetry noted of the contour of the facial features.  The facial nerve is intact, with strong symmetric movements.    Voice and Breathing - The patient was breathing comfortably without the use of accessory muscles. The patients voice was clear and strong, and had appropriate pitch and quality.    Ears - Bilateral pinna and EACs with normal appearing overlying skin. Tympanic membrane intact with good mobility on pneumatic otoscopy bilaterally. Bony landmarks of the ossicular chain are normal. The tympanic membranes are normal in appearance. No retraction, perforation, or masses.  No fluid or purulence was seen in the external canal or the middle ear.     Eyes - Extraocular movements intact.  Sclera were not icteric or injected, conjunctiva were pink and moist.    Mouth - Examination " of the oral cavity showed pink, healthy oral mucosa. No lesions or ulcerations noted.  The tongue was mobile and midline, and the dentition were in good condition.      Throat - The walls of the oropharynx were smooth, pink, moist, symmetric, and had no lesions or ulcerations.  The tonsillar pillars and soft palate were symmetric. Tonsils are symmetric. The uvula was midline on elevation.    Neck - Normal midline excursion of the laryngotracheal complex during swallowing.  Full range of motion on passive movement.  Palpation of the occipital, submental, submandibular, internal jugular chain, and supraclavicular nodes did not demonstrate any abnormal lymph nodes or masses.  The carotid pulse was palpable bilaterally.  Palpation of the thyroid was soft and smooth, with no nodules or goiter appreciated.  The trachea was mobile and midline.    Nose - External contour is symmetric, no gross deflection or scars.  Nasal mucosa is pink and moist with no abnormal mucus.  The septum was deviated to the left and obstructive, turbinates of large size and position.  No polyps, masses, or purulence noted on examination.    Neuro - Nonfocal neuro exam is normal, CN 2 through 12 intact, normal gait and muscle tone.      Performed in clinic today:  No procedures preformed in clinic today      A/P - Colton Snider is a 36 year old male Patient presents with:  RECHECK    At this point patient does have structural issues noted deviated septum large inferior turbinates not responsive to medical conservative therapy with the nasal topical sprays.  We discussed different treatment options and he is interested in septoplasty submucosal resection of inferior turbinates.We discuss risks and possible complication of septoplasty including septal perforation, bleeding(that may require packing), infection, loss of smell, stenosis, CSF rhinorrhea. With this knowledge the patient wishes to proceed with the surgery.      Miguel Farley,  MD            Again, thank you for allowing me to participate in the care of your patient.        Sincerely,        Miguel Farley MD, MD

## 2022-06-21 ENCOUNTER — TELEPHONE (OUTPATIENT)
Dept: SLEEP MEDICINE | Facility: CLINIC | Age: 37
End: 2022-06-21
Payer: COMMERCIAL

## 2022-06-21 NOTE — TELEPHONE ENCOUNTER
Spoke to patient, he wants to check on a insurance and cost first.   Gave CPT codes [id number]    He will call me back when ready to schedule

## 2023-01-10 ENCOUNTER — OFFICE VISIT (OUTPATIENT)
Dept: FAMILY MEDICINE | Facility: CLINIC | Age: 38
End: 2023-01-10
Payer: COMMERCIAL

## 2023-01-10 VITALS
BODY MASS INDEX: 34.52 KG/M2 | RESPIRATION RATE: 20 BRPM | TEMPERATURE: 98.2 F | DIASTOLIC BLOOD PRESSURE: 84 MMHG | HEART RATE: 86 BPM | HEIGHT: 70 IN | WEIGHT: 241.13 LBS | SYSTOLIC BLOOD PRESSURE: 130 MMHG | OXYGEN SATURATION: 98 %

## 2023-01-10 DIAGNOSIS — Z00.00 ROUTINE GENERAL MEDICAL EXAMINATION AT A HEALTH CARE FACILITY: Primary | ICD-10-CM

## 2023-01-10 DIAGNOSIS — E78.2 ELEVATED CHOLESTEROL WITH ELEVATED TRIGLYCERIDES: ICD-10-CM

## 2023-01-10 DIAGNOSIS — M72.2 PLANTAR FASCIITIS, RIGHT: ICD-10-CM

## 2023-01-10 DIAGNOSIS — L30.1 ECZEMA, DYSHIDROTIC: ICD-10-CM

## 2023-01-10 DIAGNOSIS — R73.01 ELEVATED FASTING BLOOD SUGAR: ICD-10-CM

## 2023-01-10 LAB
CHOLEST SERPL-MCNC: 180 MG/DL
FASTING STATUS PATIENT QL REPORTED: YES
FASTING STATUS PATIENT QL REPORTED: YES
GLUCOSE BLD-MCNC: 111 MG/DL (ref 70–99)
HDLC SERPL-MCNC: 30 MG/DL
LDLC SERPL CALC-MCNC: 115 MG/DL
NONHDLC SERPL-MCNC: 150 MG/DL
TRIGL SERPL-MCNC: 173 MG/DL

## 2023-01-10 PROCEDURE — 82947 ASSAY GLUCOSE BLOOD QUANT: CPT | Performed by: FAMILY MEDICINE

## 2023-01-10 PROCEDURE — 36415 COLL VENOUS BLD VENIPUNCTURE: CPT | Performed by: FAMILY MEDICINE

## 2023-01-10 PROCEDURE — 80061 LIPID PANEL: CPT | Performed by: FAMILY MEDICINE

## 2023-01-10 PROCEDURE — 99213 OFFICE O/P EST LOW 20 MIN: CPT | Mod: 25 | Performed by: FAMILY MEDICINE

## 2023-01-10 PROCEDURE — 99395 PREV VISIT EST AGE 18-39: CPT | Performed by: FAMILY MEDICINE

## 2023-01-10 RX ORDER — BETAMETHASONE DIPROPIONATE 0.5 MG/G
CREAM TOPICAL
Qty: 15 G | Refills: 0 | Status: SHIPPED | OUTPATIENT
Start: 2023-01-10

## 2023-01-10 ASSESSMENT — ENCOUNTER SYMPTOMS
HEMATURIA: 0
HEARTBURN: 0
FREQUENCY: 0
CHILLS: 0
FEVER: 0
ARTHRALGIAS: 1
HEMATOCHEZIA: 0
DIZZINESS: 0
EYE PAIN: 0
HEADACHES: 0
PARESTHESIAS: 0
ABDOMINAL PAIN: 0
COUGH: 1
MYALGIAS: 0
JOINT SWELLING: 0
NERVOUS/ANXIOUS: 0
SORE THROAT: 1
CONSTIPATION: 0
DIARRHEA: 0
PALPITATIONS: 0
DYSURIA: 0

## 2023-01-10 ASSESSMENT — PATIENT HEALTH QUESTIONNAIRE - PHQ9
10. IF YOU CHECKED OFF ANY PROBLEMS, HOW DIFFICULT HAVE THESE PROBLEMS MADE IT FOR YOU TO DO YOUR WORK, TAKE CARE OF THINGS AT HOME, OR GET ALONG WITH OTHER PEOPLE: NOT DIFFICULT AT ALL
SUM OF ALL RESPONSES TO PHQ QUESTIONS 1-9: 0
SUM OF ALL RESPONSES TO PHQ QUESTIONS 1-9: 0

## 2023-01-10 ASSESSMENT — PAIN SCALES - GENERAL: PAINLEVEL: MODERATE PAIN (5)

## 2023-01-10 NOTE — PROGRESS NOTES
SUBJECTIVE:   CC: Colton is an 37 year old who presents for preventative health visit.     Patient has been advised of split billing requirements and indicates understanding: Yes  Healthy Habits:     Getting at least 3 servings of Calcium per day:  NO    Bi-annual eye exam:  NO    Dental care twice a year:  Yes    Sleep apnea or symptoms of sleep apnea:  None    Diet:  Regular (no restrictions)    Frequency of exercise:  None    Taking medications regularly:  Yes    Medication side effects:  Not applicable    PHQ-2 Total Score: 0    Additional concerns today:  No    Questions about right foot pain since on a hunting trip 2 months ago.  Pain at heel of right foot.  Pain worse with first step in AM, prolongued walking and any steps after rest.  Has been icing foot, using better shoe inserts and found stiff shank walking shoes.  Walking barefoot in house.    Today's PHQ-2 Score:   PHQ-2 ( 1999 [employer]) 1/10/2023   Q1: Little interest or pleasure in doing things 0   Q2: Feeling down, depressed or hopeless 0   PHQ-2 Score 0   PHQ-2 Total Score (12-17 Years)- Positive if 3 or more points; Administer PHQ-A if positive -   Q1: Little interest or pleasure in doing things Not at all   Q2: Feeling down, depressed or hopeless Not at all   PHQ-2 Score 0       Have you ever done Advance Care Planning? (For example, a Health Directive, POLST, or a discussion with a medical provider or your loved ones about your wishes): No, advance care planning information given to patient to review.  Advanced care planning was discussed at today's visit.    Social History     Tobacco Use     Smoking status: Never     Smokeless tobacco: Never   Substance Use Topics     Alcohol use: No     Alcohol/week: 0.0 standard drinks     Comment: 2-3 beverages/weekend     If you drink alcohol do you typically have >3 drinks per day or >7 drinks per week? No    Alcohol Use 1/10/2023   Prescreen: >3 drinks/day or >7 drinks/week? No   Prescreen: >3 drinks/day or  ">7 drinks/week? -         Reviewed orders with patient. Reviewed health maintenance and updated orders accordingly - Yes      Reviewed and updated as needed this visit by clinical staff   Tobacco  Allergies  Meds              Reviewed and updated as needed this visit by Provider                     Review of Systems   Constitutional: Negative for chills and fever.   HENT: Positive for sore throat. Negative for congestion, ear pain and hearing loss.    Eyes: Negative for pain.   Respiratory: Positive for cough.    Cardiovascular: Negative for chest pain, palpitations and peripheral edema.   Gastrointestinal: Negative for abdominal pain, constipation, diarrhea, heartburn and hematochezia.   Genitourinary: Negative for dysuria, frequency, genital sores and hematuria.   Musculoskeletal: Positive for arthralgias. Negative for joint swelling and myalgias.   Neurological: Negative for dizziness, headaches and paresthesias.   Psychiatric/Behavioral: Negative for mood changes. The patient is not nervous/anxious.          OBJECTIVE:   /84   Pulse 86   Temp 98.2  F (36.8  C) (Temporal)   Resp 20   Ht 1.778 m (5' 10\")   Wt 109.4 kg (241 lb 2 oz)   SpO2 98%   BMI 34.60 kg/m      Physical Exam  Constitutional:       General: He is not in acute distress.     Appearance: He is well-developed.   HENT:      Head: Normocephalic and atraumatic.      Right Ear: Hearing, tympanic membrane, ear canal and external ear normal.      Left Ear: Hearing, tympanic membrane, ear canal and external ear normal.      Nose: Nose normal.      Mouth/Throat:      Mouth: No oral lesions.      Pharynx: Uvula midline. No oropharyngeal exudate.   Eyes:      General: Lids are normal. No scleral icterus.        Right eye: No discharge.         Left eye: No discharge.      Extraocular Movements: Extraocular movements intact.      Conjunctiva/sclera: Conjunctivae normal.      Pupils: Pupils are equal, round, and reactive to light.   Neck:      " Thyroid: No thyroid mass or thyromegaly.      Trachea: No tracheal deviation.   Cardiovascular:      Rate and Rhythm: Normal rate and regular rhythm.      Pulses: Normal pulses.      Heart sounds: Normal heart sounds, S1 normal and S2 normal. No murmur heard.    No S3 or S4 sounds.   Pulmonary:      Effort: Pulmonary effort is normal. No respiratory distress.      Breath sounds: Normal breath sounds. No wheezing or rales.   Abdominal:      General: Bowel sounds are normal. There is no distension.      Palpations: Abdomen is soft. There is no mass.      Tenderness: There is no abdominal tenderness. There is no guarding.   Musculoskeletal:         General: No deformity. Normal range of motion.      Cervical back: Normal range of motion and neck supple.   Feet:      Comments: Tender to palpation at insertion of right plantar fascia.  No other obvious foot deformity, good active range of motion and passive range of motion.  Lymphadenopathy:      Cervical: No cervical adenopathy.      Upper Body:      Right upper body: No supraclavicular adenopathy.      Left upper body: No supraclavicular adenopathy.   Skin:     General: Skin is warm and dry.      Findings: No lesion or rash.   Neurological:      Mental Status: He is alert and oriented to person, place, and time.      Motor: No abnormal muscle tone.      Deep Tendon Reflexes: Reflexes are normal and symmetric.   Psychiatric:         Speech: Speech normal.         Thought Content: Thought content normal.         Judgment: Judgment normal.               ASSESSMENT/PLAN:     ASSESSMENT/ORDERS:    ICD-10-CM    1. Routine general medical examination at a health care facility  Z00.00       2. Plantar fasciitis, right  M72.2       3. Eczema, dyshidrotic  L30.1 betamethasone dipropionate (DIPROSONE) 0.05 % external cream     aluminum chloride (DRYSOL) 20 % external solution      4. Elevated fasting blood sugar  R73.01 Glucose     Glucose      5. Elevated cholesterol with  "elevated triglycerides  E78.2 Lipid panel reflex to direct LDL Fasting     Lipid panel reflex to direct LDL Fasting        PLAN:  1.  Discussed home care for plantar fascitiis.  See after visit summary for details.  Evaluation with Dr. Genaro Rucker, podiatry, if no improvement in 2 months or if symptoms worsen.  2.  Labs as above.     Patient has been advised of split billing requirements and indicates understanding: Yes      COUNSELING:   Reviewed preventive health counseling, as reflected in patient instructions      BMI:   Estimated body mass index is 34.6 kg/m  as calculated from the following:    Height as of this encounter: 1.778 m (5' 10\").    Weight as of this encounter: 109.4 kg (241 lb 2 oz).   Weight management plan: Discussed healthy diet and exercise guidelines      He reports that he has never smoked. He has never used smokeless tobacco.            Jj Puckett MD  [facility]  Answers for HPI/ROS submitted by the patient on 1/10/2023  If you checked off any problems, how difficult have these problems made it for you to do your work, take care of things at home, or get along with other people?: Not difficult at all  PHQ9 TOTAL SCORE: 0      "

## 2023-01-10 NOTE — PATIENT INSTRUCTIONS
Preventive Health Recommendations  Male Ages 26 - 39    Yearly exam:             See your health care provider every year in order to  o   Review health changes.   o   Discuss preventive care.    o   Review your medicines if your doctor has prescribed any.  You should be tested each year for STDs (sexually transmitted diseases), if you re at risk.   After age 35, talk to your provider about cholesterol testing. If you are at risk for heart disease, have your cholesterol tested at least every 5 years.   If you are at risk for diabetes, you should have a diabetes test (fasting glucose).  Shots: Get a flu shot each year. Get a tetanus shot every 10 years.     Nutrition:  Eat at least 5 servings of fruits and vegetables daily.   Eat whole-grain bread, whole-wheat pasta and brown rice instead of white grains and rice.   Get adequate Calcium and Vitamin D.     Lifestyle  Exercise for at least 150 minutes a week (30 minutes a day, 5 days a week). This will help you control your weight and prevent disease.   Limit alcohol to one drink per day.   No smoking.   Wear sunscreen to prevent skin cancer.   See your dentist every six months for an exam and cleaning.     PLANTAR FASCIITIS  The  plantar fascia  is a thick fibrous layer of tissue that covers the bones on the bottom of your foot. It supports the foot bones in an arched position.  Plantar fasciitis  is a painful inflammation of the plantar fascia due to overuse. This can develop gradually or suddenly. It usually affects one foot at a time but can affect both feet. Heel pain can be sharp and feel like a knife sticking in the bottom of your foot. Pain may occur after exercising, long distance jogging, stair climbing, long periods of standing, or after getting up from a seated position.  Risk factors include arthritis, diabetes, obesity or recent weight gain, flat-foot, high arch, wearing high heels or loose shoes or shoes with poor arch support.  Sudden changes in  "activity or shoe gear may contribute to symptoms.  Foot pain from this condition is usually worse in the morning and improves with walking. By the end of the day there may be a dull aching. Treatment requires improved support of feet, short-term rest and controlling inflammation. It may take up to nine months before all symptoms go away with the measures described below.  A steroid injection into the foot, or surgery may be needed if this is becomes long standing or severe.  HOME CARE  If you are overweight, lose weight to promote healing.  Choose supportive shoes (stiff through the shank) with good arch support and shock absorbency. Replace athletic shoes when they become worn out. Don t walk or run barefoot.  Do not go without shoes while you are rehabbing your feet.  If needed, buy a pair of \"house shoes\" so you do not walk barefoot.    Shoe inserts are an important part of treatment. You can buy off-the-shelf shoe inserts inexpensively such as [employer].  The best ones are custom molded to your foot with a prescription.  Night splints keep the plantar fascia gently stretched while you sleep and will eliminate morning pain. Wear it ALL NIGHT EVERY NIGHT, or any time you sit for a long time.  Reduce by 10% or more the activities that stress the feet: jogging, prolonged standing or walking, high impact sports, etc.  Stretch your feet. Gently flex your ankle by leaning into a wall or counter or drop your heel from a step.  Stretch two minutes of every hour you are awake.  Icing or massage may help heel pain. Apply an ice pack or frozen water or Coke bottle to the heel for 10-20 minutes as a preventive or after an acute flare of symptoms. You may repeat this as needed.   "

## 2023-01-14 ENCOUNTER — HEALTH MAINTENANCE LETTER (OUTPATIENT)
Age: 38
End: 2023-01-14

## 2023-01-16 NOTE — RESULT ENCOUNTER NOTE
Colton,  Your results still show mildly elevated blood sugar, triglycerides and LDL (bad cholesterol), low HDL (good cholesterol).  Lifestyle management with improved diet and aerobic exercise will help this.  Please let me know if you have any questions or wish to discuss more.  Recheck in a year.    Sincerely,  Dr. Puckett

## 2024-01-02 ENCOUNTER — E-VISIT (OUTPATIENT)
Dept: URGENT CARE | Facility: CLINIC | Age: 39
End: 2024-01-02
Payer: COMMERCIAL

## 2024-01-02 DIAGNOSIS — R06.02 SOB (SHORTNESS OF BREATH): Primary | ICD-10-CM

## 2024-01-02 PROCEDURE — 99207 PR NON-BILLABLE SERV PER CHARTING: CPT | Performed by: NURSE PRACTITIONER

## 2024-01-02 NOTE — PATIENT INSTRUCTIONS
Dear Colton Snider,    We are sorry you are not feeling well. Based on the responses you provided, it is recommended that you be seen in-person in urgent care so we can better evaluate your symptoms. Please click here to find the nearest urgent care location to you.   You will not be charged for this Visit. Thank you for trusting us with your care.    ANA MARÍA Kingsley CNP

## 2024-01-04 ENCOUNTER — PATIENT OUTREACH (OUTPATIENT)
Dept: CARE COORDINATION | Facility: CLINIC | Age: 39
End: 2024-01-04
Payer: COMMERCIAL

## 2024-01-10 ENCOUNTER — OFFICE VISIT (OUTPATIENT)
Dept: INTERNAL MEDICINE | Facility: CLINIC | Age: 39
End: 2024-01-10
Payer: COMMERCIAL

## 2024-01-10 VITALS
WEIGHT: 235 LBS | SYSTOLIC BLOOD PRESSURE: 116 MMHG | BODY MASS INDEX: 32.9 KG/M2 | TEMPERATURE: 98.4 F | HEART RATE: 82 BPM | OXYGEN SATURATION: 99 % | RESPIRATION RATE: 14 BRPM | DIASTOLIC BLOOD PRESSURE: 78 MMHG | HEIGHT: 71 IN

## 2024-01-10 DIAGNOSIS — Z00.00 ROUTINE GENERAL MEDICAL EXAMINATION AT A HEALTH CARE FACILITY: Primary | ICD-10-CM

## 2024-01-10 LAB
ALBUMIN SERPL BCG-MCNC: 4.5 G/DL (ref 3.5–5.2)
ALBUMIN UR-MCNC: NEGATIVE MG/DL
ALP SERPL-CCNC: 88 U/L (ref 40–150)
ALT SERPL W P-5'-P-CCNC: 72 U/L (ref 0–70)
ANION GAP SERPL CALCULATED.3IONS-SCNC: 11 MMOL/L (ref 7–15)
APPEARANCE UR: CLEAR
AST SERPL W P-5'-P-CCNC: 32 U/L (ref 0–45)
BILIRUB SERPL-MCNC: 0.2 MG/DL
BILIRUB UR QL STRIP: NEGATIVE
BUN SERPL-MCNC: 15.8 MG/DL (ref 6–20)
CALCIUM SERPL-MCNC: 9.4 MG/DL (ref 8.6–10)
CHLORIDE SERPL-SCNC: 100 MMOL/L (ref 98–107)
CHOLEST SERPL-MCNC: 174 MG/DL
COLOR UR AUTO: YELLOW
CREAT SERPL-MCNC: 0.88 MG/DL (ref 0.67–1.17)
DEPRECATED HCO3 PLAS-SCNC: 26 MMOL/L (ref 22–29)
EGFRCR SERPLBLD CKD-EPI 2021: >90 ML/MIN/1.73M2
ERYTHROCYTE [DISTWIDTH] IN BLOOD BY AUTOMATED COUNT: 12.7 % (ref 10–15)
FASTING STATUS PATIENT QL REPORTED: NO
GLUCOSE SERPL-MCNC: 75 MG/DL (ref 70–99)
GLUCOSE UR STRIP-MCNC: NEGATIVE MG/DL
HCT VFR BLD AUTO: 44.3 % (ref 40–53)
HDLC SERPL-MCNC: 24 MG/DL
HGB BLD-MCNC: 14.7 G/DL (ref 13.3–17.7)
HGB UR QL STRIP: NEGATIVE
KETONES UR STRIP-MCNC: NEGATIVE MG/DL
LDLC SERPL CALC-MCNC: ABNORMAL MG/DL
LEUKOCYTE ESTERASE UR QL STRIP: NEGATIVE
MCH RBC QN AUTO: 28.3 PG (ref 26.5–33)
MCHC RBC AUTO-ENTMCNC: 33.2 G/DL (ref 31.5–36.5)
MCV RBC AUTO: 85 FL (ref 78–100)
NITRATE UR QL: NEGATIVE
NONHDLC SERPL-MCNC: 150 MG/DL
PH UR STRIP: 6 [PH] (ref 5–7)
PLATELET # BLD AUTO: 460 10E3/UL (ref 150–450)
POTASSIUM SERPL-SCNC: 4.1 MMOL/L (ref 3.4–5.3)
PROT SERPL-MCNC: 8 G/DL (ref 6.4–8.3)
RBC # BLD AUTO: 5.2 10E6/UL (ref 4.4–5.9)
SODIUM SERPL-SCNC: 137 MMOL/L (ref 135–145)
SP GR UR STRIP: 1.02 (ref 1–1.03)
TRIGL SERPL-MCNC: 438 MG/DL
UROBILINOGEN UR STRIP-MCNC: 2 MG/DL
WBC # BLD AUTO: 8.7 10E3/UL (ref 4–11)

## 2024-01-10 PROCEDURE — 80061 LIPID PANEL: CPT | Performed by: INTERNAL MEDICINE

## 2024-01-10 PROCEDURE — 99396 PREV VISIT EST AGE 40-64: CPT | Performed by: INTERNAL MEDICINE

## 2024-01-10 PROCEDURE — 81003 URINALYSIS AUTO W/O SCOPE: CPT | Performed by: INTERNAL MEDICINE

## 2024-01-10 PROCEDURE — 85027 COMPLETE CBC AUTOMATED: CPT | Performed by: INTERNAL MEDICINE

## 2024-01-10 PROCEDURE — 80053 COMPREHEN METABOLIC PANEL: CPT | Performed by: INTERNAL MEDICINE

## 2024-01-10 PROCEDURE — 36415 COLL VENOUS BLD VENIPUNCTURE: CPT | Performed by: INTERNAL MEDICINE

## 2024-01-10 PROCEDURE — 83721 ASSAY OF BLOOD LIPOPROTEIN: CPT | Mod: 59 | Performed by: INTERNAL MEDICINE

## 2024-01-10 ASSESSMENT — ENCOUNTER SYMPTOMS
ARTHRALGIAS: 1
HEARTBURN: 0
WEAKNESS: 0
PARESTHESIAS: 0
HEADACHES: 0
COUGH: 1
JOINT SWELLING: 0
HEMATURIA: 0
CHILLS: 0
DYSURIA: 0
HEMATOCHEZIA: 0
FEVER: 0
FREQUENCY: 0
MYALGIAS: 0
EYE PAIN: 0
CONSTIPATION: 0
NERVOUS/ANXIOUS: 0
NAUSEA: 0
DIARRHEA: 0
PALPITATIONS: 0
DIZZINESS: 0
SHORTNESS OF BREATH: 0
SORE THROAT: 0
ABDOMINAL PAIN: 0

## 2024-01-10 NOTE — PROGRESS NOTES
"SUBJECTIVE:   Colton is a 38 year old, presenting for the following:  Physical        Healthy Habits:     Getting at least 3 servings of Calcium per day:  Yes    Bi-annual eye exam:  NO    Dental care twice a year:  Yes    Sleep apnea or symptoms of sleep apnea:  None    Diet:  Regular (no restrictions)    Frequency of exercise:  None    Duration of exercise:  N/A    Taking medications regularly:  Yes    Medication side effects:  None    Additional concerns today:  No      Today's PHQ-2 Score:       1/10/2024     2:46 PM   PHQ-2 ( 1999 [employer])   Q1: Little interest or pleasure in doing things 0   Q2: Feeling down, depressed or hopeless 0   PHQ-2 Score 0   Q1: Little interest or pleasure in doing things Not at all   Q2: Feeling down, depressed or hopeless Not at all   PHQ-2 Score 0                 -------------------------------------      Social History     Tobacco Use    Smoking status: Never    Smokeless tobacco: Never   Substance Use Topics    Alcohol use: No     Alcohol/week: 0.0 standard drinks of alcohol     Comment: 2-3 beverages/weekend             1/10/2024     2:45 PM   Alcohol Use   Prescreen: >3 drinks/day or >7 drinks/week? No       Last PSA: No results found for: \"PSA\"    Reviewed orders with patient. Reviewed health maintenance and updated orders accordingly - Yes  Lab work is in process  Labs reviewed in EPIC  BP Readings from Last 3 Encounters:   01/10/24 116/78   01/10/23 130/84   06/20/22 (!) 136/90    Wt Readings from Last 3 Encounters:   01/10/24 106.6 kg (235 lb)   01/10/23 109.4 kg (241 lb 2 oz)   06/20/22 107.5 kg (237 lb)                  Patient Active Problem List   Diagnosis   (none) - all problems resolved or deleted     Past Surgical History:   Procedure Laterality Date    VASECTOMY  04/07/2017    [facility] MUSCLE-SKIN FLAP,LEG  2010    skin flap on right lower leg       Social History     Tobacco Use    Smoking status: Never    Smokeless tobacco: Never   Substance Use Topics    Alcohol use: No "     Alcohol/week: 0.0 standard drinks of alcohol     Comment: 2-3 beverages/weekend     Family History   Problem Relation Age of Onset    Diabetes Mother     Hypertension Father     Parkinsonism Father     Cerebrovascular Disease Paternal Grandmother     Cerebrovascular Disease Paternal Grandfather     Colon Cancer No family hx of     Prostate Cancer No family hx of     Coronary Artery Disease No family hx of          Current Outpatient Medications   Medication Sig Dispense Refill    aluminum chloride (DRYSOL) 20 % external solution Apply topically At Bedtime 35 mL 11    betamethasone dipropionate (DIPROSONE) 0.05 % external cream APPLY TOPICALLY TO AFFECTED AREA TWO TIMES A DAY DO NOT APPLY TO FACE Strength: 0.05 % 15 g 0     Allergies   Allergen Reactions    Diflucan [Fluconazole] Rash       Reviewed and updated as needed this visit by clinical staff   Tobacco  Allergies  Meds              Reviewed and updated as needed this visit by Provider                 No past medical history on file.   Past Surgical History:   Procedure Laterality Date    VASECTOMY  04/07/2017    [facility] MUSCLE-SKIN FLAP,LEG  2010    skin flap on right lower leg       Review of Systems   Constitutional:  Negative for chills and fever.   HENT:  Negative for congestion, ear pain, hearing loss and sore throat.    Eyes:  Negative for pain and visual disturbance.   Respiratory:  Positive for cough. Negative for shortness of breath.    Cardiovascular:  Negative for chest pain, palpitations and peripheral edema.   Gastrointestinal:  Negative for abdominal pain, constipation, diarrhea, heartburn, hematochezia and nausea.   Genitourinary:  Negative for dysuria, frequency, genital sores, hematuria, impotence, penile discharge and urgency.   Musculoskeletal:  Positive for arthralgias. Negative for joint swelling and myalgias.   Skin:  Negative for rash.   Neurological:  Negative for dizziness, weakness, headaches and paresthesias.  "  Psychiatric/Behavioral:  Negative for mood changes. The patient is not nervous/anxious.      Patient recently had a cough which was associated with some yellow and green phlegm, this has now resolved since making this appointment.    OBJECTIVE:   /78   Pulse 82   Temp 98.4  F (36.9  C)   Resp 14   Ht 1.795 m (5' 10.67\")   Wt 106.6 kg (235 lb)   SpO2 99%   BMI 33.08 kg/m      Physical Exam  GENERAL: healthy, alert and no distress  EYES: Eyes grossly normal to inspection, PERRL and conjunctivae and sclerae normal  HENT: ear canals and TM's normal, nose and mouth without ulcers or lesions  NECK: no adenopathy, no asymmetry, masses, or scars and thyroid normal to palpation  RESP: lungs clear to auscultation - no rales, rhonchi or wheezes  CV: regular rate and rhythm, normal S1 S2, no S3 or S4, no murmur, click or rub, no peripheral edema and peripheral pulses strong  ABDOMEN: soft, nontender, no hepatosplenomegaly, no masses and bowel sounds normal  MS: no gross musculoskeletal defects noted, no edema  SKIN: no suspicious lesions or rashes  NEURO: Normal strength and tone, mentation intact and speech normal  PSYCH: mentation appears normal, affect normal/bright    Diagnostic Test Results:  Labs reviewed in Epic  No results found for this or any previous visit (from the past 24 hour(s)).    ASSESSMENT/PLAN:       ICD-10-CM    1. Routine general medical examination at a health care facility  Z00.00 CBC with platelets     Comprehensive metabolic panel (BMP + Alb, Alk Phos, ALT, AST, Total. Bili, TP)     Lipid panel reflex to direct LDL Fasting     UA Macroscopic with reflex to Microscopic and Culture - Lab Collect     CBC with platelets     Comprehensive metabolic panel (BMP + Alb, Alk Phos, ALT, AST, Total. Bili, TP)     Lipid panel reflex to direct LDL Fasting     UA Macroscopic with reflex to Microscopic and Culture - Lab Collect          Patient has been advised of split billing requirements and " "indicates understanding: Yes      COUNSELING:   Reviewed preventive health counseling, as reflected in patient instructions       Regular exercise       Healthy diet/nutrition       Vision screening       Hearing screening      BMI:   Estimated body mass index is 33.08 kg/m  as calculated from the following:    Height as of this encounter: 1.795 m (5' 10.67\").    Weight as of this encounter: 106.6 kg (235 lb).   Weight management plan: Discussed healthy diet and exercise guidelines      He reports that he has never smoked. He has never used smokeless tobacco.      I have reviewed the patient's vaccination schedule and discussed the benefits of prophylactic vaccination in detail.  I recommend the patient contact their pharmacist for vaccinations.  Discussed that most insurance companies now favor reimbursement to the pharmacies and it will financially behoove the patient to have vaccinations performed at their pharmacy.            Obed Mata DO  [facility]  "

## 2024-01-11 LAB — LDLC SERPL DIRECT ASSAY-MCNC: 108 MG/DL

## 2024-01-18 ENCOUNTER — PATIENT OUTREACH (OUTPATIENT)
Dept: CARE COORDINATION | Facility: CLINIC | Age: 39
End: 2024-01-18
Payer: COMMERCIAL

## 2024-12-11 ENCOUNTER — PATIENT OUTREACH (OUTPATIENT)
Dept: CARE COORDINATION | Facility: CLINIC | Age: 39
End: 2024-12-11
Payer: COMMERCIAL

## 2024-12-25 ENCOUNTER — PATIENT OUTREACH (OUTPATIENT)
Dept: CARE COORDINATION | Facility: CLINIC | Age: 39
End: 2024-12-25
Payer: COMMERCIAL

## 2025-03-08 ENCOUNTER — HEALTH MAINTENANCE LETTER (OUTPATIENT)
Age: 40
End: 2025-03-08